# Patient Record
Sex: FEMALE | Race: WHITE | Employment: UNEMPLOYED | URBAN - METROPOLITAN AREA
[De-identification: names, ages, dates, MRNs, and addresses within clinical notes are randomized per-mention and may not be internally consistent; named-entity substitution may affect disease eponyms.]

---

## 2017-03-25 ENCOUNTER — HOSPITAL ENCOUNTER (EMERGENCY)
Facility: CLINIC | Age: 27
Discharge: HOME | End: 2017-03-25
Attending: PHYSICIAN ASSISTANT | Admitting: PHYSICIAN ASSISTANT
Payer: COMMERCIAL

## 2017-03-25 VITALS
TEMPERATURE: 98.5 F | RESPIRATION RATE: 20 BRPM | SYSTOLIC BLOOD PRESSURE: 122 MMHG | BODY MASS INDEX: 24.55 KG/M2 | WEIGHT: 130 LBS | OXYGEN SATURATION: 99 % | HEIGHT: 61 IN | HEART RATE: 88 BPM | DIASTOLIC BLOOD PRESSURE: 90 MMHG

## 2017-03-25 DIAGNOSIS — K13.79 MOUTH PAIN: ICD-10-CM

## 2017-03-25 PROCEDURE — 99283 EMERGENCY DEPT VISIT LOW MDM: CPT | Mod: 25

## 2017-03-25 PROCEDURE — 64400 NJX AA&/STRD TRIGEMINAL NRV: CPT

## 2017-03-25 RX ORDER — HYDROCODONE BITARTRATE AND ACETAMINOPHEN 5; 325 MG/1; MG/1
1-2 TABLET ORAL EVERY 4 HOURS PRN
Qty: 12 TABLET | Refills: 0 | Status: SHIPPED | OUTPATIENT
Start: 2017-03-25 | End: 2017-05-05

## 2017-03-25 RX ORDER — BUPIVACAINE HYDROCHLORIDE 5 MG/ML
INJECTION, SOLUTION PERINEURAL
Status: DISCONTINUED
Start: 2017-03-25 | End: 2017-03-25 | Stop reason: HOSPADM

## 2017-03-25 RX ORDER — PENICILLIN V POTASSIUM 500 MG/1
500 TABLET, FILM COATED ORAL 4 TIMES DAILY
Qty: 28 TABLET | Refills: 0 | Status: SHIPPED | OUTPATIENT
Start: 2017-03-25 | End: 2017-04-01

## 2017-03-25 NOTE — ED PROVIDER NOTES
"  History   Chief Complaint:  Dental Pain     HPI   Blanca Coleman is a 26 year old female who presents to the emergency department for evaluation of dental pain. The patient indicates that today she began having left lower dental pain that started today. She denies any pus or drainage from the tooth. She denies any fevers or other associated symptoms.     Allergies:  NKDA     Medications:    The patient is currently on no regular medications.      Past Medical History:    The patient denies any significant past medical history.    Past Surgical History:    The patient does not have any pertinent past surgical history  Family History:    No past pertinent family history.     Social History:  Negative for tobacco use.  Negative for alcohol use.    Review of Systems   HENT: Positive for dental problem.    All other systems reviewed and are negative.    Physical Exam   First Vitals:  BP: 122/90  Pulse: 88  Temp: 98.5  F (36.9  C)  Resp: 20  Height: 154.9 cm (5' 1\")  Weight: 59 kg (130 lb)  SpO2: 99 %    Physical Exam  General: Well-nourished, Mild  discomfort  Eyes: PERRL, conjunctivae pink no scleral icterus or conjunctival injection  ENT:  Moist mucus membranes.  Tooth # 17 with erythema. The tooth is cracked.  No drainage.  No abscess along gumline.  No cheek or submandibular edema.  No trismus.  Normal voice.  Respiratory:  Normal respiratory effort and breath sounds.  CV: Normal rate   Neuro: Alert and oriented to person/place/time  Skin: Warm and dry. Normal appearance of visualized exposed skin  Psychiatric: Affect normal. Normal personal interaction.     Emergency Department Course   Procedure:  PROCEDURE:  Dental Block  LOCATION:  Lower left  ANESTHESIA: Regional block using Bupivacaine 0.5% total of 3 mLs  PROCEDURE NOTE: The patient tolerated the procedure well with good relief of discomfort and there were no complications.    Emergency Department Course:  Nursing notes and vitals reviewed. I performed an " exam of the patient as documented above.     Findings and plan explained to the Patient. Patient discharged home with instructions regarding supportive care, medications, and reasons to return. The importance of close follow-up was reviewed. The patient was prescribed Norco and Penicillin.     Impression & Plan    Medical Decision Making:  Blanca Coleman is a 26 year old female who presents for evaluation of jaw pain. This appears to be secondary to a dental issue. There is no abscess detected around the tooth amenable to incision and drainage. The differential diagnosis includes: cracked tooth syndrome, pulpitis, sub-apical abscess, facial cellulitis, alveolitis amongst others. There is no evidence of deep space infections, significant facial swelling, Lemierre's Syndrome or Reinier's angina. There are no posterior pharyngeal space (RPA, PTA) infections detected.  Follow up with a dentist/endodontist in the coming days is indicated for further work up and treatment.  Will start pain medication and antibiotics.      Diagnosis:    ICD-10-CM    1. Mouth pain K13.79      Discharge Medications:  New Prescriptions    HYDROCODONE-ACETAMINOPHEN (NORCO) 5-325 MG PER TABLET    Take 1-2 tablets by mouth every 4 hours as needed for moderate to severe pain    PENICILLIN V POTASSIUM (VEETID) 500 MG TABLET    Take 1 tablet (500 mg) by mouth 4 times daily for 7 days     Janay Flynn  3/25/2017   [facility] EMERGENCY DEPARTMENT    Janay ORELLANA Said, am serving as a scribe on 3/25/2017 at 3:16 PM to personally document services performed by Arabella Condon based on my observations and the provider's statements to me.        Arabella Condon PA-C  03/25/17 [id number]

## 2017-03-25 NOTE — ED AVS SNAPSHOT
[facility] Emergency Department    [address]    [facility] [address]    Phone:  [phone]    Fax:  [phone]                                       Blanca Coleman   MRN: [id number]    Department:  [facility] Emergency Department   Date of Visit:  3/25/2017           Patient Information     Date Of Birth          1990        Your diagnoses for this visit were:     Mouth pain        You were seen by Arabella Condon PA-C.      Follow-up Information     Follow up with [facility] Emergency Department.    Specialty:  EMERGENCY MEDICINE    Why:  If symptoms worsen    Contact information:    [address]  [facility] [address] [phone]        Follow up with Clinic, [facility] In 2 days.    Specialty:  Internal Medicine    Why:  As needed    Contact information:    [address]  [facility] [address]  [phone]          Discharge Instructions       Take ibuprofen 600 mg every 6 hours for the next 3-5 days or until followup with dentist. If given other medications used as directed. If given antibiotics make sure and finish the entire course. Ultimately you need to follow up with your dentist. If you cannot get into your dentist in the next couple of days I have given you a list of other options for dental care. You will need to get further pain management from your dentist. Return to emergency room if you develop high fevers, difficulty breathing, shortness of breath, the inability to swallow your own saliva, or for other concerns.     Emergency Dental Care  [email or web address]   [address]   Directions   [phone]    Emer[facility] Dental Services  [email or web address]  Emergency Dental Clinic You Can Rely On. Open 24 Hours. Call Now.  [address], [place]   Directions   [phone]    [facility]  Address: [facility] [facility], [address], [place] [address]    Phone: [phone]    Dental Clinics Accepting [place] Clients    Psychiatric    Child and Teen Checkups  [facility]  [phone]    [facility]  [address]  [place], [place]  [phone]    [facility]  [address] [place], [place]  [phone]    [facility]  [address] [facility]  [phone]    [facility]  [address] [facility]  [phone]    [facility]  [address] [facility]  [phone]    [facility]  [address]  [phone]    [facility]  [address]  [phone]    [place] [facility]  [place]  [phone]    [facility]  [address] [facility]  [phone]    [facility]  [address] [facility]  [phone]    [facility]  [address] [place] [address]  [phone]    [facility]  [address] [facility]  [phone]    [facility]  [address] [facility]  [phone]    ADDITIONAL RESOURCES    [facility]  [phone]    [facility]  [phone]    [facility]  [phone]    This web site contains many locations and information about what services they provide:    http://[place]-low-cost-eyhoey-allv-ibvzbhoph5.friendshelpingfriends.Memento.Cisiv/        24 Hour Appointment Hotline       To make an appointment at any [facility], call [phone] ([phone]). If you don't have a family doctor or clinic, we will help you find one. [facility] clinics are conveniently located to serve the needs of you and your family.             Review of your medicines      START taking        Dose / Directions Last dose taken    HYDROcodone-acetaminophen 5-325 MG per tablet   Commonly known as:  NORCO   Dose:  1-2 tablet   Quantity:  12 tablet        Take 1-2 tablets by mouth every 4 hours as needed  for moderate to severe pain   Refills:  0        penicillin V potassium 500 MG tablet   Commonly known as:  VEETID   Dose:  500 mg   Quantity:  28 tablet        Take 1 tablet (500 mg) by mouth 4 times daily for 7 days   Refills:  0                Prescriptions were sent or printed at these locations (2 Prescriptions)                   Other Prescriptions                Printed at Department/Unit printer (2 of 2)         HYDROcodone-acetaminophen (NORCO) 5-325 MG per tablet               penicillin V potassium (VEETID) 500 MG tablet                Orders Needing Specimen Collection     None      Pending Results     No orders found from 3/23/2017 to 3/26/2017.            Pending Culture Results     No orders found from 3/23/2017 to 3/26/2017.             Test Results from your hospital stay            Clinical Quality Measure: Blood Pressure Screening     Your blood pressure was checked while you were in the emergency department today. The last reading we obtained was  BP: 122/90 . Please read the guidelines below about what these numbers mean and what you should do about them.  If your systolic blood pressure (the top number) is less than 120 and your diastolic blood pressure (the bottom number) is less than 80, then your blood pressure is normal. There is nothing more that you need to do about it.  If your systolic blood pressure (the top number) is 120-139 or your diastolic blood pressure (the bottom number) is 80-89, your blood pressure may be higher than it should be. You should have your blood pressure rechecked within a year by a primary care provider.  If your systolic blood pressure (the top number) is 140 or greater or your diastolic blood pressure (the bottom number) is 90 or greater, you may have high blood pressure. High blood pressure is treatable, but if left untreated over time it can put you at risk for heart attack, stroke, or kidney failure. You should have your blood pressure rechecked by a primary  "care provider within the next 4 weeks.  If your provider in the emergency department today gave you specific instructions to follow-up with your doctor or provider even sooner than that, you should follow that instruction and not wait for up to 4 weeks for your follow-up visit.        Thank you for choosing [facility]       Thank you for choosing [facility] for your care. Our goal is always to provide you with excellent care. Hearing back from our patients is one way we can continue to improve our services. Please take a few minutes to complete the written survey that you may receive in the mail after you visit with us. Thank you!        Waynaharwhat3words Information     [employer] lets you send messages to your doctor, view your test results, renew your prescriptions, schedule appointments and more. To sign up, go to [email or web address] . Click on \"Log in\" on the left side of the screen, which will take you to the Welcome page. Then click on \"Sign up Now\" on the right side of the page.     You will be asked to enter the access code listed below, as well as some personal information. Please follow the directions to create your username and password.     Your access code is: [email or web address]  Expires: 2017  3:46 PM     Your access code will  in 90 days. If you need help or a new code, please call your [facility] clinic or [phone].        Care EveryWhere ID     This is your Care EveryWhere ID. This could be used by other organizations to access your [facility] medical records  [id number]        After Visit Summary       This is your record. Keep this with you and show to your community pharmacist(s) and doctor(s) at your next visit.                  "

## 2017-03-25 NOTE — DISCHARGE INSTRUCTIONS
Take ibuprofen 600 mg every 6 hours for the next 3-5 days or until followup with dentist. If given other medications used as directed. If given antibiotics make sure and finish the entire course. Ultimately you need to follow up with your dentist. If you cannot get into your dentist in the next couple of days I have given you a list of other options for dental care. You will need to get further pain management from your dentist. Return to emergency room if you develop high fevers, difficulty breathing, shortness of breath, the inability to swallow your own saliva, or for other concerns.     Emergency Dental Care  [email or web address]   [address]   Directions   [phone]    Emergency Dental Services  [email or web address]  Emergency Dental Clinic You Can Rely On. Open 24 Hours. Call Now.  [address] [facility] [address]   Directions   [phone]    [facility]  Address: [address] [place] [address], [address], [place] [address]   Phone: [phone]    Dental Clinics Accepting [facility]    Child and Teen Checkups  [facility]  [phone]    [facility]  [address]  [place], [place]  [phone]    The [address]  [phone]    [facility]  [address] [facility]  [phone]    [facility]  [address] [facility]  [phone]    [facility]  [address] [facility]  [phone]    [facility]  [address] [facility]  [phone]    [facility]  [address]  [phone]    [place] [facility]  [place]  [phone]    [facility]  [address] [place] [address]  [phone]    [facility]  [address] [facility]  [phone]    [facility]  [address] [place] [address]  [phone]    [facility]  [address] [facility]  [place]  [phone]    [facility]  [address] [facility]  [phone]    ADDITIONAL RESOURCES    [facility]  [phone]    [facility]  [phone]    [facility]  [phone]    This web site contains many locations and information about what services they provide:    http://[email or web address]-[email or web address]-lajy-[email or web address].friendshelpingfriends.[email or web address].Causata/

## 2017-03-25 NOTE — ED AVS SNAPSHOT
[facility] Emergency Department    [address]    [facility] [address]    Phone:  [phone]    Fax:  [phone]                                       Blanca Coleman   MRN: [id number]    Department:  [facility] Emergency Department   Date of Visit:  3/25/2017           After Visit Summary Signature Page     I have received my discharge instructions, and my questions have been answered. I have discussed any challenges I see with this plan with the nurse or doctor.    ..........................................................................................................................................  Patient/Patient Representative Signature      ..........................................................................................................................................  Patient Representative Print Name and Relationship to Patient    ..................................................               ................................................  Date                                            Time    ..........................................................................................................................................  Reviewed by Signature/Title    ...................................................              ..............................................  Date                                                            Time

## 2017-05-05 ENCOUNTER — HOSPITAL ENCOUNTER (EMERGENCY)
Facility: CLINIC | Age: 27
Discharge: HOME | End: 2017-05-05
Attending: EMERGENCY MEDICINE | Admitting: EMERGENCY MEDICINE
Payer: COMMERCIAL

## 2017-05-05 VITALS
TEMPERATURE: 98.2 F | DIASTOLIC BLOOD PRESSURE: 79 MMHG | HEART RATE: 98 BPM | OXYGEN SATURATION: 100 % | SYSTOLIC BLOOD PRESSURE: 109 MMHG | RESPIRATION RATE: 16 BRPM

## 2017-05-05 DIAGNOSIS — K08.89 PAIN, DENTAL: ICD-10-CM

## 2017-05-05 DIAGNOSIS — K04.7 DENTAL INFECTION: ICD-10-CM

## 2017-05-05 PROCEDURE — 25000132 ZZH RX MED GY IP 250 OP 250 PS 637: Performed by: EMERGENCY MEDICINE

## 2017-05-05 PROCEDURE — 99283 EMERGENCY DEPT VISIT LOW MDM: CPT | Mod: 25

## 2017-05-05 PROCEDURE — 64400 NJX AA&/STRD TRIGEMINAL NRV: CPT

## 2017-05-05 RX ORDER — PENICILLIN V POTASSIUM 500 MG/1
500 TABLET, FILM COATED ORAL 4 TIMES DAILY
Qty: 40 TABLET | Refills: 0 | Status: SHIPPED | OUTPATIENT
Start: 2017-05-05 | End: 2017-05-15

## 2017-05-05 RX ORDER — BUPIVACAINE HYDROCHLORIDE AND EPINEPHRINE 5; 5 MG/ML; UG/ML
INJECTION, SOLUTION PERINEURAL
Status: DISCONTINUED
Start: 2017-05-05 | End: 2017-05-05 | Stop reason: HOSPADM

## 2017-05-05 RX ORDER — AMOXICILLIN 500 MG/1
1000 CAPSULE ORAL ONCE
Status: COMPLETED | OUTPATIENT
Start: 2017-05-05 | End: 2017-05-05

## 2017-05-05 RX ORDER — HYDROCODONE BITARTRATE AND ACETAMINOPHEN 5; 325 MG/1; MG/1
1-2 TABLET ORAL EVERY 4 HOURS PRN
Qty: 15 TABLET | Refills: 0 | Status: SHIPPED | OUTPATIENT
Start: 2017-05-05 | End: 2018-01-19

## 2017-05-05 RX ADMIN — AMOXICILLIN 1000 MG: 500 CAPSULE ORAL at 03:44

## 2017-05-05 NOTE — ED AVS SNAPSHOT
[facility] Emergency Department    [address]    [facility] [address]    Phone:  [phone]    Fax:  [phone]                                       Blanca Coleman   MRN: [id number]    Department:  [facility] Emergency Department   Date of Visit:  5/5/2017           After Visit Summary Signature Page     I have received my discharge instructions, and my questions have been answered. I have discussed any challenges I see with this plan with the nurse or doctor.    ..........................................................................................................................................  Patient/Patient Representative Signature      ..........................................................................................................................................  Patient Representative Print Name and Relationship to Patient    ..................................................               ................................................  Date                                            Time    ..........................................................................................................................................  Reviewed by Signature/Title    ...................................................              ..............................................  Date                                                            Time

## 2017-05-05 NOTE — ED AVS SNAPSHOT
[facility] Emergency Department    [address]    [facility] [address]    Phone:  [phone]    Fax:  [phone]                                       Blanca Coleman   MRN: [id number]    Department:  [facility] Emergency Department   Date of Visit:  5/5/2017           Patient Information     Date Of Birth          1990        Your diagnoses for this visit were:     Dental infection     Pain, dental        You were seen by Carl Navarrete MD.      Follow-up Information     Follow up with [facility] In 4 days.    Specialty:  Internal Medicine    Why:  and with your dentist as soon as possible    Contact information:    [address]  [facility] [address]  [phone]          Discharge Instructions         Discharge Instructions  Dental Pain    You have been seen today for a toothache. Your pain may be caused by an exposed nerve, an infection (pulpitis), a root abscess, or other problems. You will need to see a dentist for a solution to your tooth problem. Emergency Department care is only to help control your problem until you can see a dentist.  Today, we did not find any sign that your toothache was caused by a serious condition, but sometimes symptoms develop over time and cannot be found during an emergency visit, so it is very important that you follow up with your dentist.      Return to the Emergency Department if:    You develop a fever over 101 degrees Fahrenheit.    You can t open your mouth normally, can t move your tongue well, or can t swallow.    You have new or increased swelling of your face or neck.    You develop drainage of pus or foul smelling material from around your tooth.  What can I do to help myself?    Take any antibiotic the doctor may have prescribed for you today.    Avoid very hot or very cold foods as both can cause pain.    Make an appointment to see a dentist as soon as possible. If you wish, we can  provide you with a list of low-cost dental clinics.   If you were given a prescription for medicine here today, be sure to read all of the information (including the package insert) that comes with your prescription.  This will include important information about the medicine, its side effects, and any warnings that you need to know about.  The pharmacist who fills the prescription can provide more information and answer questions you may have about the medicine.  If you have questions or concerns that the pharmacist cannot address, please call or return to the Emergency Department.   Opioid Medication Information    Pain medications are among the most commonly prescribed medicines, so we are including this information for all our patients. If you did not receive pain medication or get a prescription for pain medicine, you can ignore it.     You may have been given a prescription for an opioid (narcotic) pain medicine and/or have received a pain medicine while here in the Emergency Department. These medicines can make you drowsy or impaired. You must not drive, operate dangerous equipment, or engage in any other dangerous activities while taking these medications. If you drive while taking these medications, you could be arrested for DUI, or driving under the influence. Do not drink any alcohol while you are taking these medications.     Opioid pain medications can cause addiction. If you have a history of chemical dependency of any type, you are at a higher risk of becoming addicted to pain medications.  Only take these prescribed medications to treat your pain when all other options have been tried. Take it for as short a time and as few doses as possible. Store your pain pills in a secure place, as they are frequently stolen and provide a dangerous opportunity for children or visitors in your house to start abusing these powerful medications. We will not replace any lost or stolen medicine.  As soon as your pain  is better, you should flush all your remaining medication.     Many prescription pain medications contain Tylenol  (acetaminophen), including Vicodin , Tylenol #3 , Norco , Lortab , and Percocet .  You should not take any extra pills of Tylenol  if you are using these prescription medications or you can get very sick.  Do not ever take more than 3000 mg of acetaminophen in any 24 hour period.    All opioids tend to cause constipation. Drink plenty of water and eat foods that have a lot of fiber, such as fruits, vegetables, prune juice, apple juice and high fiber cereal.  Take a laxative if you don t move your bowels at least every other day. Miralax , Milk of Magnesia, Colace , or Senna  can be used to keep you regular.      Remember that you can always come back to the Emergency Department if you are not able to see your regular doctor in the amount of time listed above, if you get any new symptoms, or if there is anything that worries you.        Discharge Instructions  Dental Pain    You have been seen today for a toothache. Your pain may be caused by an exposed nerve, an infection (pulpitis), a root abscess, or other problems. You will need to see a dentist for a solution to your tooth problem. Emergency Department care is only to help control your problem until you can see a dentist.  Today, we did not find any sign that your toothache was caused by a serious condition, but sometimes symptoms develop over time and cannot be found during an emergency visit, so it is very important that you follow up with your dentist.      Return to the Emergency Department if:    You develop a fever over 101 degrees Fahrenheit    You can t open your mouth normally, can t move your tongue well, or can t swallow    You have new or increased swelling of your face or neck.    You develop drainage of pus or foul smelling material from around your tooth.  What can I do to help myself?    Take any antibiotic the doctor may have  prescribed for you today.    Avoid very hot or very cold foods as both can cause pain.    Make an appointment to see a dentist as soon as possible. If you wish, we can provide you with a list of low-cost dental clinics.       Remember that you can always come back to the Emergency Department if you are not able to see your regular doctor in the amount of time listed above, if you get any new symptoms, or if there is anything that worries you.        Dental Resources  Name/Address/Phone Eligibility Hours Fee   [facility]  [address], [address]  [place] [address]  [phone] Anyone Call for appointment [place]  Medical Assistance  Private Insurance   [facility]  [facility]  [address] [place] [address]  [phone] Anyone Call for appointment    [facility] refers to low-cost dental clinics for non-preventive care    Icelandic Interpreters available Prices start at   Adults        Cleaning $36-$160        X-Ray $20-40  Children        Cleaning $15        X-ray $10-20        Fluoride $10  Accepts cash, check or credit;  Does not take insurance or MA.   [facility]  [address] [place]  [address]  [phone] Anyone Afternoons and evenings    September-May    Answers phones after 10 AM $30.00 per visit   ($15.00 per visit if 62 or older)   Preventive care.  Restoration care; sliding fee; MA   [facility]  [address] [place] [address]  [phone] Children birth to age 18 and pregnant women    [facility] Residents without insurance will be asked to apply for Assured Care. M TH F 8:30 am - 5 pm  T W 8:30 am - 7 pm    30 locations metro wide    Call for appointment and to confirm hours. Sliding Fee  [facility]  Assured Access  Private Insurance    8 Languages Spoken   [facility]  [address] [place] [phone] Anyone Call for  appointment Sliding Fee  Accept insurance, [place],   [place] and self-pay.  Call if no insurance.    All services provided.  Staff fluent in Hmong, Laotian, Andre, Ukrainian, Greek, Dominican, and Farsi.   [facility]  [address] [place] [address]  [phone] Children  Adults in a walk in basis Mon - Fri. 8 - 5 pm       (closed 1-2 pm)  General Dentists & Hygienists  Private Dentists  Dentures Fees based on family size and income ranging from 40% - 100% payment by patient.   [facility]  [address] [place]  [address]    [phone] Anyone Mon - Fri 7:30 am - 5:00 pm  By Appt.    Tues & Wed @ 3:00 call for urgent care Appt for next day service Sliding fee:  MA; Insurance   [facility]  [address] [place]  [address]  [phone] Anyone Call for an appointment.  Days open vary every semester. Adult cleaning $25  Child cleaning $15  X-Rays $10-$15  Whitening services available  $75, includes cleaning  Seniors 50% off   [facility]  [address] [place]  [address]  [phone] Anyone M-F 8 am - 5 pm Most insurances accepted.  MA and Sliding Scale.   [facility]  [address] [place] [address]  [phone] Anyone without insurance Call to make appt   M-F 9:00 am - 5 pm   (Closed noon hour 12-1)    6 pm- 8 pm Evening hours also available for care Sliding fee based on income and size of household.   [facility]  D[facility] Clinic  [address] [place]  [address]  [phone] press option 1    For the [facility] press option 4 Anyone              Anyone Monday  4 - 6:30 pm  Tuesday 12:30 - 3 & 4-6:30  Thursday 8:30 - 11 am & 12-2:30 pm  September through May only    All year around on Thursdays from 5-9 pm (only time a dentist is in.) Cleanings & X-Rays Only  Cleanings:  Adults $30                    Kids $20  X-Rays:  Adults $34                Kids $10    MA and Sliding fee   [facility]  [address] [place] [address]    [phone] Anyone    (Hmong interpreters available) M-F 8:00 am - 5:00 pm       By appointment only  (same day appointments available) Sliding fee ($40+ may be due at appointment, remainder billed); MA; Insurance   [facility]  [address] [place] [address]    [phone]   Anyone    (Hmong interpreters available) M-Th 8:00 am - 8:00 pm  F 8:00 am - 5:00 pm    By appointment only  (same day appointments available) Sliding fee ($40+ may be due at appointment, remainder billed); MA; Insurance   [facility]  [address] [place], [place]  [address] [phone] Anyone    Must live within [facility] to qualify for sliding fee services Mon, Tues, Thurs, Fri  8:30 am - 5:00 pm  Wed. 8:30 am - 7:00 pm  All other services by appt. only Sliding Fee; MA   Offer payment plans    Have financial workers that will assist with [place] and will use sliding fee for those who do not qualify.      [facility]  [address] [place] [phone] Anyone without insurance     Hours and day of week vary  (Call ahead for hours)    Walk-in only Free Services    Cleanings; Fillings; Extractions   The [address]  [phone] Anyone Call for an appointment Accept patients with [place] and Medical Assistance.  10% discount if bill is paid in full on day of service.  No sliding fee scale.     [facility]  [address] [place], [place] [phone] Anyone M-F  8 am-5 pm  Call for appt.    Walk-in hours 8 am - 11am and 1 pm - 5 pm, however take scheduled appointments first    No emergency services or oral surgeries Sliding Fee available with an [place] or [place] denial letter and proof of  income.    Accepts Assured Access card and MA coverage.     Name/Address/Phone Eligibility Hours Fee   [facility]  [address] [place], [place]  [phone] Anyone with emergencies only M & W clinic begins at 6 pm   Call ahead    Alternate Fridays for children by Appt only Free   [facility]  [address] [place] [address]  [phone]    Emergencies (adults only):  [phone] Anyone Free walk-in screens for oral surgery    Call ahead for hours    All other services by appt. only  Accepts MA for pediatrics only    Rates are about 25% - 40% less than private dental office.    No sliding fee scale   [facility]  [address] [place] [address]  [phone] Anyone as long as they do not have health insurance Hours on Mondays, Tuesdays, and Thursdays Sliding fees based on monthly income    No root canals, tooth pulls or emergencies   [facility]  [address] [place] [phone] Anyone  M - F 8:00 am - 5:00 pm  Wed 8:00 am - 8 pm Sliding fees; MA; Insurance   [facility] [address]  [phone] Anyone age 55+ M - F 8:00 am - 4:30 pm    Appt. only Set slightly lower fees;  MA; Insurance         Give Kids a smile day in [facility] Children Takes place in February at a few locations                          Dental Pain:      Dear Emergency Department Patient:     Here at [facility], we are always pleased to evaluate you for emergency conditions and offer a screening examination. Today, we have seen you and determined that you have dental pain and/or a dental problem.  We do not have the equipment and/or advanced training to perform definitive dental care, therefore, you need to be seen by a dentist for further care.     You may see your regular dentist if you have one, or we have attached a list of community dental providers,  including some who provide care at a reduced fee.      Please be aware that if a narcotic medication was prescribed, it will not be refilled in the emergency department.  Accordingly, if you should have ongoing problems and/or pain, you should contact a dentist right away for definitive treatment.    Sincerely,        [facility] (HARRY)              24 Hour Appointment Hotline       To make an appointment at any [facility], call [phone] ([phone]). If you don't have a family doctor or clinic, we will help you find one. [facility] are conveniently located to serve the needs of you and your family.             Review of your medicines      START taking        Dose / Directions Last dose taken    HYDROcodone-acetaminophen 5-325 MG per tablet   Commonly known as:  NORCO   Dose:  1-2 tablet   Quantity:  15 tablet        Take 1-2 tablets by mouth every 4 hours as needed for moderate to severe pain   Refills:  0        penicillin V potassium 500 MG tablet   Commonly known as:  VEETID   Dose:  500 mg   Quantity:  40 tablet        Take 1 tablet (500 mg) by mouth 4 times daily for 10 days   Refills:  0                Prescriptions were sent or printed at these locations (2 Prescriptions)                   Other Prescriptions                Printed at Department/Unit printer (2 of 2)         HYDROcodone-acetaminophen (NORCO) 5-325 MG per tablet               penicillin V potassium (VEETID) 500 MG tablet                Orders Needing Specimen Collection     None      Pending Results     No orders found from 5/3/2017 to 5/6/2017.            Pending Culture Results     No orders found from 5/3/2017 to 5/6/2017.            Pending Results Instructions     If you had any lab results that were not finalized at the time of your Discharge, you can call the ED Lab Result RN at [phone]. You will be contacted by this team for any positive Lab results or changes in  treatment. The nurses are available 7 days a week from 10A to 6:30P.  You can leave a message 24 hours per day and they will return your call.        Test Results From Your Hospital Stay               Clinical Quality Measure: Blood Pressure Screening     Your blood pressure was checked while you were in the emergency department today. The last reading we obtained was  BP: 109/79 . Please read the guidelines below about what these numbers mean and what you should do about them.  If your systolic blood pressure (the top number) is less than 120 and your diastolic blood pressure (the bottom number) is less than 80, then your blood pressure is normal. There is nothing more that you need to do about it.  If your systolic blood pressure (the top number) is 120-139 or your diastolic blood pressure (the bottom number) is 80-89, your blood pressure may be higher than it should be. You should have your blood pressure rechecked within a year by a primary care provider.  If your systolic blood pressure (the top number) is 140 or greater or your diastolic blood pressure (the bottom number) is 90 or greater, you may have high blood pressure. High blood pressure is treatable, but if left untreated over time it can put you at risk for heart attack, stroke, or kidney failure. You should have your blood pressure rechecked by a primary care provider within the next 4 weeks.  If your provider in the emergency department today gave you specific instructions to follow-up with your doctor or provider even sooner than that, you should follow that instruction and not wait for up to 4 weeks for your follow-up visit.        Thank you for choosing [facility]       Thank you for choosing [facility] for your care. Our goal is always to provide you with excellent care. Hearing back from our patients is one way we can continue to improve our services. Please take a few minutes to complete the written survey that you may receive in the mail after you  "visit with us. Thank you!        [email or web address] Information     [employer] lets you send messages to your doctor, view your test results, renew your prescriptions, schedule appointments and more. To sign up, go to [email or web address] . Click on \"Log in\" on the left side of the screen, which will take you to the Welcome page. Then click on \"Sign up Now\" on the right side of the page.     You will be asked to enter the access code listed below, as well as some personal information. Please follow the directions to create your username and password.     Your access code is: [email or web address]  Expires: 2017  3:46 PM     Your access code will  in 90 days. If you need help or a new code, please call your [facility] clinic or [phone].        Care EveryWhere ID     This is your Care EveryWhere ID. This could be used by other organizations to access your [facility] medical records  [id number]        After Visit Summary       This is your record. Keep this with you and show to your community pharmacist(s) and doctor(s) at your next visit.                  "

## 2017-05-05 NOTE — ED PROVIDER NOTES
CHIEF COMPLAINT:  Dental pain.      HISTORY OF PRESENT ILLNESS:  Ms. Blanca Coleman is a pleasant 26-year-old female who came to the ER today for pain involving her left mandibular wisdom tooth.  The pain actually first occurred a couple months ago but resolved after a course of antibiotics.  She never followed up with her dentist due to life complications and the fact that it was better.  The pain began to bother her again a couple of days ago and was worse tonight.  She was unable to sleep despite taking over-the-counter pain medicines so came here to the ER.  No known trauma to the tooth.  No specific trigger for the pain to occur lately.  No fever or chills.  No trouble swallowing.  No trouble opening or closing her mouth.  No facial swelling.      PAST MEDICAL HISTORY:  None.      PAST SURGICAL HISTORY:  None.      MEDICATIONS:  Currently none; she had been on a course of penicillin about a month ago.      ALLERGIES:  None.      SOCIAL HISTORY:  She is a nonsmoker, no alcohol.      REVIEW OF SYSTEMS:  Negative except as above.      PHYSICAL EXAMINATION:   VITAL SIGNS:  Blood pressure 109/79, pulse 98, respiratory rate 16, O2 sat 100% on room air, temperature 98.2.   GENERAL:  This is a smiling, nontoxic female.  She does appear uncomfortable.   HEENT:  Head is atraumatic, normocephalic.  No facial swelling.  No buccal space abscess.  No trismus, no submandibular swelling.  Oral mucosa is pink and moist.  She is status post extraction of 3 wisdom teeth but the wisdom tooth on the left mandible appears to be present and there is a large dental caries affecting the posterior and lateral portion of that tooth.  There is no surrounding gingival erythema, no visible abscess or palpable abscess intraorally.  No buccal space of submandibular abscess or swelling.  Pharynx is normal without any erythema.  Uvula midline phonation normal.  Airway patent.   NECK:  Normal range of motion.  Trachea midline, no stridor, no  cervical adenopathy.   CARDIOVASCULAR:  Regular rate and rhythm, no murmurs, rubs or gallops  pelvic.   PULMONARY:  Lungs sounds clear and equal.   SKIN:  Warm and dry, no rash, pallor, cyanosis or diaphoresis.   NEUROLOGIC:  Alert and oriented x3.  GCS is 15.  Cognition and speech normal strength and sensation.  Cranial nerves are grossly intact.   PSYCHIATRIC:  Normal.      EMERGENCY DEPARTMENT COURSE:  The patient endorsed very good pain relief after a previous dental block performed last time she was here in the emergency room and requested that we do that again.   Procedure would be left inferior alveolar nerve dental block.      PROCEDURE:  The patient was positioned appropriately.  Topical anesthesia with benzocaine was applied;  using a 27-gauge needle, I infiltrated about 3 mL of 0.5% bupivacaine with epinephrine into the soft tissue adjacent to the patient's left mandibular ramus in the location appropriate for an inferior alveolar nerve block.  Aspiration confirmed no intravascular injection.  Good anesthesia was achieved after the  block.  No complications were noted.      MEDICAL DECISION MAKING AND PLAN:  This is a 26-year-old female who came back to the ER [facility] for evaluation of pain stemming from her left mandibular wisdom tooth and affecting her left jaw and face.  She does have a dental cavity there and I suspect she is developing pulpitis as a cause for her pain.  No abscess that requires drainage.  We will put her back on antibiotics.  No evidence for Reinier's angina or other severe complications or airway compromise or lesion.        We gave her a dental referral sheet for outpatient dental followup.  She understands that we cannot pull the tooth here in the ER.  Additionally, since this is her second visit for this tooth I did inform her of the [facility] dental pain policy and she understands we cannot give future prescriptions for opiates.  She says that she never actually filled her first  prescription and according to the [place] prescription database, I do not see any filled prescriptions.  We also will put the patient back on antibiotics.  She says she will follow up with a dentist as soon as possible.  Return to the ER if progressive facial swelling and trouble swallowing or any other concerns.      CLINICAL IMPRESSION:   1.  Dental pain.   2.  Jaw pain.         ANABEL WEN MD             D: 2017 08:10   T: 2017 11:52   MT: [id number]      Name:     CHRIS DELANEY   MRN:      [id number]        Account:      [id number]   :      1990           Visit Date:   2017      Document: [id number]

## 2017-05-05 NOTE — ED NOTES
Pt c/o dental pain, same thing she was here for last month on the 25th. Has not seen dentist.    Pt A&O x 3, CMS x 3, ABCD's adequate in triage

## 2017-05-05 NOTE — DISCHARGE INSTRUCTIONS
Discharge Instructions  Dental Pain    You have been seen today for a toothache. Your pain may be caused by an exposed nerve, an infection (pulpitis), a root abscess, or other problems. You will need to see a dentist for a solution to your tooth problem. Emergency Department care is only to help control your problem until you can see a dentist.  Today, we did not find any sign that your toothache was caused by a serious condition, but sometimes symptoms develop over time and cannot be found during an emergency visit, so it is very important that you follow up with your dentist.      Return to the Emergency Department if:    You develop a fever over 101 degrees Fahrenheit.    You can t open your mouth normally, can t move your tongue well, or can t swallow.    You have new or increased swelling of your face or neck.    You develop drainage of pus or foul smelling material from around your tooth.  What can I do to help myself?    Take any antibiotic the doctor may have prescribed for you today.    Avoid very hot or very cold foods as both can cause pain.    Make an appointment to see a dentist as soon as possible. If you wish, we can provide you with a list of low-cost dental clinics.   If you were given a prescription for medicine here today, be sure to read all of the information (including the package insert) that comes with your prescription.  This will include important information about the medicine, its side effects, and any warnings that you need to know about.  The pharmacist who fills the prescription can provide more information and answer questions you may have about the medicine.  If you have questions or concerns that the pharmacist cannot address, please call or return to the Emergency Department.   Opioid Medication Information    Pain medications are among the most commonly prescribed medicines, so we are including this information for all our patients. If you did not receive pain medication or get  a prescription for pain medicine, you can ignore it.     You may have been given a prescription for an opioid (narcotic) pain medicine and/or have received a pain medicine while here in the Emergency Department. These medicines can make you drowsy or impaired. You must not drive, operate dangerous equipment, or engage in any other dangerous activities while taking these medications. If you drive while taking these medications, you could be arrested for DUI, or driving under the influence. Do not drink any alcohol while you are taking these medications.     Opioid pain medications can cause addiction. If you have a history of chemical dependency of any type, you are at a higher risk of becoming addicted to pain medications.  Only take these prescribed medications to treat your pain when all other options have been tried. Take it for as short a time and as few doses as possible. Store your pain pills in a secure place, as they are frequently stolen and provide a dangerous opportunity for children or visitors in your house to start abusing these powerful medications. We will not replace any lost or stolen medicine.  As soon as your pain is better, you should flush all your remaining medication.     Many prescription pain medications contain Tylenol  (acetaminophen), including Vicodin , Tylenol #3 , Norco , Lortab , and Percocet .  You should not take any extra pills of Tylenol  if you are using these prescription medications or you can get very sick.  Do not ever take more than 3000 mg of acetaminophen in any 24 hour period.    All opioids tend to cause constipation. Drink plenty of water and eat foods that have a lot of fiber, such as fruits, vegetables, prune juice, apple juice and high fiber cereal.  Take a laxative if you don t move your bowels at least every other day. Miralax , Milk of Magnesia, Colace , or Senna  can be used to keep you regular.      Remember that you can always come back to the Emergency  Department if you are not able to see your regular doctor in the amount of time listed above, if you get any new symptoms, or if there is anything that worries you.        Discharge Instructions  Dental Pain    You have been seen today for a toothache. Your pain may be caused by an exposed nerve, an infection (pulpitis), a root abscess, or other problems. You will need to see a dentist for a solution to your tooth problem. Emergency Department care is only to help control your problem until you can see a dentist.  Today, we did not find any sign that your toothache was caused by a serious condition, but sometimes symptoms develop over time and cannot be found during an emergency visit, so it is very important that you follow up with your dentist.      Return to the Emergency Department if:    You develop a fever over 101 degrees Fahrenheit    You can t open your mouth normally, can t move your tongue well, or can t swallow    You have new or increased swelling of your face or neck.    You develop drainage of pus or foul smelling material from around your tooth.  What can I do to help myself?    Take any antibiotic the doctor may have prescribed for you today.    Avoid very hot or very cold foods as both can cause pain.    Make an appointment to see a dentist as soon as possible. If you wish, we can provide you with a list of low-cost dental clinics.       Remember that you can always come back to the Emergency Department if you are not able to see your regular doctor in the amount of time listed above, if you get any new symptoms, or if there is anything that worries you.        Dental Resources  Name/Address/Phone Eligibility Hours Fee   [facility]  [address], [address]  [place], [place] [id number] [phone] Anyone Call for appointment [place]  Medical Assistance  Private Insurance   [facility]  [address] [place] [address] [phone] Anyone Call  for appointment    [facility] refers to low-cost dental clinics for non-preventive care    [place] Interpreters available Prices start at   Adults        Cleaning $36-$160        X-Ray $20-40  Children        Cleaning $15        X-ray $10-20        Fluoride $10  Accepts cash, check or credit;  Does not take insurance or MA.   [facility]  [address] [place]  [address]  [phone] Anyone Afternoons and evenings    September-May    Answers phones after 10 AM $30.00 per visit   ($15.00 per visit if 62 or older)   Preventive care.  Restoration care; sliding fee; MA   [facility]  [address] [place] [address]  [phone] Children birth to age 18 and pregnant women    [facility] Residents without insurance will be asked to apply for Assured Care. M TH F 8:30 am - 5 pm  T W 8:30 am - 7 pm    30 locations metro wide    Call for appointment and to confirm hours. Sliding Fee  [facility]  Medical Assistance  Assured Access  Private Insurance    8 Languages Spoken   [facility] [address] [address]  [phone] Anyone Call for appointment Sliding Fee  Accept insurance, [place],   [place] and self-pay.  Call if no insurance.    All services provided.  Staff fluent in Hmong, Laotian, French, Yi, Hungarian, Uruguayan, and Farsi.   [facility]  [address] [place] [address]  [phone] Children  Adults in a walk in basis Mon - Fri. 8 - 5 pm       (closed 1-2 pm)  General Dentists & Hygienists  Private Dentists  Dentures Fees based on family size and income ranging from 40% - 100% payment by patient.   [facility]  [address] [place], [place]  [phone] Anyone Mon - Fri 7:30 am - 5:00 pm  By Appt.    Tues & Wed @ 3:00 call for urgent care Appt for next day service Sliding fee:  MA; Insurance   [facility]  [address]  [place]  [address]  [phone] Anyone Call for an appointment.  Days open vary every semester. Adult cleaning $25  Child cleaning $15  X-Rays $10-$15  Whitening services available  $75, includes cleaning  Seniors 50% off   [facility]  [address] [place]  [address]  [phone] Anyone M-F 8 am - 5 pm Most insurances accepted.  MA and Sliding Scale.   [facility]  [address] [place] [address]  [phone] Anyone without insurance Call to make appt   M-F 9:00 am - 5 pm   (Closed noon hour 12-1)    6 pm- 8 pm Evening hours also available for care Sliding fee based on income and size of household.   [facility]  Dental Clinic  [address] [place]  [address]  [phone] press option 1    For the [facility] press option 4 Anyone              Anyone Monday  4 - 6:30 pm  Tuesday 12:30 - 3 & 4-6:30  Thursday 8:30 - 11 am & 12-2:30 pm  September through May only    All year around on Thursdays from 5-9 pm (only time a dentist is in.) Cleanings & X-Rays Only  Cleanings:  Adults $30                   Kids $20  X-Rays:  Adults $34                Kids $10    MA and Sliding fee   [facility]  [address] [place] [address]    [phone] Anyone    (Hmong interpreters available) M-F 8:00 am - 5:00 pm       By appointment only  (same day appointments available) Sliding fee ($40+ may be due at appointment, remainder billed); MA; Insurance   [facility]  [address] [place], [place] [phone]   Anyone    (Hmong interpreters available) M-Th 8:00 am - 8:00 pm  F 8:00 am - 5:00 pm    By appointment only  (same day appointments available) Sliding fee ($40+ may be due at appointment, remainder billed); MA; Insurance   [facility]  [address] [place], [place]  [id number] [phone]  Anyone    Must live within [facility] to qualify for sliding fee services Mon, Tues, Thurs, Fri  8:30 am - 5:00 pm  Wed. 8:30 am - 7:00 pm  All other services by appt. only Sliding Fee; MA   Offer payment plans    Have financial workers that will assist with [place] and will use sliding fee for those who do not qualify.      [facility]  [address] [place] [address]  [phone] Anyone without insurance     Hours and day of week vary  (Call ahead for hours)    Walk-in only Free Services    Cleanings; Fillings; Extractions   [address]  [address]  [phone] Anyone Call for an appointment Accept patients with [place] and Medical Assistance.  10% discount if bill is paid in full on day of service.  No sliding fee scale.     [facility]  [address] [place] [address]  [phone] Anyone M-F  8 am-5 pm  Call for appt.    Walk-in hours 8 am - 11am and 1 pm - 5 pm, however take scheduled appointments first    No emergency services or oral surgeries Sliding Fee available with an [place] or [place] denial letter and proof of income.    Accepts Assured Access card and [place] coverage.     Name/Address/Phone Eligibility Hours Fee   [facility]  [address] [place]  [address]  [phone] Anyone with emergencies only M & W clinic begins at 6 pm   Call ahead    Alternate Fridays for children by Appt only Free   [facility]  [address] [place], [place] [address] [phone]    Emergencies (adults only):  [phone] Anyone Free walk-in screens for oral surgery    Call ahead for hours    All other services by appt. only  Accepts MA for pediatrics only    Rates are about 25% - 40% less than private dental office.    No sliding fee scale   [facility]  [address] [place]  [address]  [phone] Anyone as long as they do  not have health insurance Hours on Mondays, Tuesdays, and Thursdays Sliding fees based on monthly income    No root canals, tooth pulls or emergencies   [facility]  [address] [id number] [phone] Anyone  M - F 8:00 am - 5:00 pm  Wed 8:00 am - 8 pm Sliding fees; MA; Insurance   [facility] [address]  [id number] [phone] Anyone age 55+ M - F 8:00 am - 4:30 pm    Appt. only Set slightly lower fees;  MA; Insurance         Give Kids a smile day in [facility] Children Takes place in February at a few locations                          Dental Pain:      Dear Emergency Department Patient:     Here at [facility], we are always pleased to evaluate you for emergency conditions and offer a screening examination. Today, we have seen you and determined that you have dental pain and/or a dental problem.  We do not have the equipment and/or advanced training to perform definitive dental care, therefore, you need to be seen by a dentist for further care.     You may see your regular dentist if you have one, or we have attached a list of community dental providers, including some who provide care at a reduced fee.      Please be aware that if a narcotic medication was prescribed, it will not be refilled in the emergency department.  Accordingly, if you should have ongoing problems and/or pain, you should contact a dentist right away for definitive treatment.    Sincerely,        [facility])

## 2018-01-19 ENCOUNTER — HOSPITAL ENCOUNTER (EMERGENCY)
Facility: CLINIC | Age: 28
Discharge: HOME | End: 2018-01-19
Attending: EMERGENCY MEDICINE | Admitting: EMERGENCY MEDICINE
Payer: COMMERCIAL

## 2018-01-19 VITALS
SYSTOLIC BLOOD PRESSURE: 134 MMHG | HEART RATE: 77 BPM | RESPIRATION RATE: 16 BRPM | OXYGEN SATURATION: 99 % | TEMPERATURE: 97.8 F | DIASTOLIC BLOOD PRESSURE: 85 MMHG
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
## 2018-01-19 PROCEDURE — 99282 EMERGENCY DEPT VISIT SF MDM: CPT

## 2018-01-19 RX ORDER — PERMETHRIN 50 MG/G
CREAM TOPICAL
Qty: 60 G | Refills: 1 | Status: SHIPPED | OUTPATIENT
Start: 2018-01-19 | End: 2018-05-28

## 2018-01-19 RX ORDER — BENZOCAINE/MENTHOL 6 MG-10 MG
LOZENGE MUCOUS MEMBRANE 2 TIMES DAILY
Qty: 30 G | Refills: 0 | Status: SHIPPED | OUTPATIENT
Start: 2018-01-19 | End: 2018-05-28

## 2018-01-19 NOTE — ED PROVIDER NOTES
History     Chief Complaint:  Insect Bites    HPI   Blanca Coleman is a 27 year old female who presents to the emergency department for evaluation of insect bites. The patient reports going to someone's house two days ago and falling asleep in her friend's bed. Within 2 hours of waking up, she noticed itchy bug bites distributed on her neck, hands, and legs. She noticed that the bites swelled when she itched them. She denies any bites on her feet. The patient does not recall seeing any actual insect crawling in the bed.    Allergies:  Allergies reviewed. No pertinent allergies.     Medications:    Medications reviewed. No pertinent outpatient prescriptions.    Past Medical History:    History reviewed. No pertinent past medical history.    Past Surgical History:    History reviewed. No pertinent surgical history.    Family History:    Family history reviewed. No pertinent family history.    Social History:  Smoking Status: Never Smoker  Smokeless Tobacco: Unknown  Alcohol Use: No  Marital Status: Single     Review of Systems   Skin: Positive for rash (insect bites, pruritic ).   All other systems reviewed and are negative.    Physical Exam     Patient Vitals for the past 24 hrs:   BP Temp Temp src Pulse Resp SpO2   01/19/18 1326 134/85 97.8  F (36.6  C) Temporal 77 16 99 %     Physical Exam  Vital signs and nursing notes reviewed.     Constitutional: laying on gurney appears comfortable  HENT: No evidence of facial or head injury.    Eyes: Conjunctivae are normal bilaterally. Pupils equal  Neck: normal range of motion  Cardiovascular: Normal rate.    Pulmonary/Chest: No respiratory distress.   Neurological: Alert and oriented. No focal weakness  Skin: Skin is warm and dry. Multiple areas of red, raised, erythematous lesions on the right neck, hands, and wrists without vesicles. Appears to be consistent with bug bites versus scabies. No infectious type rash noted.  Psych: normal affect     Emergency Department  Course     Emergency Department Course:    Nursing notes and vitals reviewed.    I performed an exam of the patient as documented above.     I personally reviewed the physical exam results with the patient and answered all related questions prior to discharge.    Impression & Plan      Medical Decision Making:  Blanca Coleman is a 27 year old female who presents with itchy, bug bite type findings on her skin. She says she stayed at someone's house and then a couple hours later when she got home, she started having the itchy findings. On exam, it is difficult to really sort out if she has scabies or other type of insect bite. There is no significant infectious or other concerning type of rash. I recommended a trial of the permethrin and then she was given a prescription of hydrocortisone to be used on the skin except for the face, as needed for itching. She is aware about the eradication of bed bugs and the treatment for that in case this was the case. No one else in her family reports having issues. The patient was discharged home in good condition.    Diagnosis:    ICD-10-CM    1. Insect bite, initial encounter W57.XXXA      Disposition:   The patient was discharged home.     Discharge Medications:  Discharge Medication List as of 1/19/2018  2:22 PM      START taking these medications    Details   permethrin (ELIMITE) 5 % cream Apply cream from head to toe (except the face); leave on for 8-14 hours then wash off with water; reapply in 1 week if live mites appear.Disp-60 g, R-1Local Print      hydrocortisone (CORTAID) 1 % cream Apply topically 2 times dailyDisp-30 g, R-0Local Print             Scribe Disclosure:  I, Arleen Amezcua, am serving as a scribe at 1:38 PM on 1/19/2018 to document services personally performed by Levon Soni MD based on my observations and the provider's statements to me.    [facility] EMERGENCY DEPARTMENT       Levon Soni MD  01/19/18 0738

## 2018-01-19 NOTE — ED NOTES
Arrive to ED reports she may have stayed at someones house that has bed bugs small bites noted on body with itching. A/ox 3.

## 2018-01-19 NOTE — DISCHARGE INSTRUCTIONS
Bedbugs    After years of being very rare in the , bedbugs are making a comeback. These bugs are small, about the size of an apple seed. They are reddish-brown, oval, and look slightly flattened. Bedbugs feed on human and animal blood, usually at night during sleep. Bedbugs are a nuisance. But they are not a major threat to your health.  Facts about bedbugs    Bedbugs are active mainly at night. During the day, they hide in dark places, often in and around where people or animals sleep. They are commonly found on mattresses and boxsprings and behind headboards. But they can hide anywhere.    Bedbugs are small and hard to see. They are often carried from place to place in items like luggage, furniture, and clothing. This is why they spread so easily.    Bedbugs are not attracted to dirt. Even the cleanest house or hotel can have bedbugs.    Unlike mosquitoes, bedbugs do not transmit disease. If you are bitten, you do not have to worry about catching a blood-borne illness.    Insect repellents have little effect on bedbugs.    Adult bedbugs can live for several months without a blood feeding.    Bedbugs are very hard to get rid of. If an infestation is suspected, it is recommended that a professional  be called.  Signs of bedbugs  Bites can be the first sign of a bedbug infestation. When inspecting for the bugs, look in crevices of mattresses and box springs, behind the headboard, and in and on objects near or under the bed. You may see the bugs themselves. Or, you may see tiny dark stains on fabric or carpets. Smears of blood on sheets and nightclothes upon awakening are another sign. In some cases, the bugs are so well hidden they can t be found unless items are taken apart.  Bedbug bites  Bedbugs look for food at night. They bite while people or animals are sleeping. The bites are most often painless. Many people never know they ve been bitten. But some people develop an itchy red welt or swelling.  And if a person has an allergic reaction, severe itching, blisters, or hives can develop. Bites are often on areas that are exposed, such as the head, neck, arms, and hands. Bedbug bites are not dangerous and don t spread illness. But if the bite is scratched and the skin is broken and irritated, there is a chance that a skin infection can develop.  Treating bites  Bite symptoms usually go away on their own within a week or two. During this time, over-the-counter (OTC) hydrocortisone ointment or cream can help relieve itching and swelling. If itching is bad, an OTC antihistamine that s taken by mouth (oral) can help. If an infection develops from scratching the bites, your healthcare provider can prescribe an antibiotic.  If you were bitten by bedbugs in your home, talk to a licensed pest-control professional or company. They can inspect your home and help you get rid of the bugs safely.  When to call your healthcare provider   If you have bites, call your healthcare provider if you develop any of the following:    A fever of 100.4 F (38 C) or higher, or as directed by your provider    Signs of infection of the bites, such as increased swelling and pain, warmth, or oozing    Signs of allergic reaction, such as hives, spreading rash, throat itching or swelling, or wheezing   Avoiding bedbugs    Avoid buying used beds. But if you do buy used bed frames, mattresses, box springs, or other furniture, check them carefully for bedbugs before bringing them into your home.    If bedbugs are found or suspected in the bed, use mattress and box spring encasement covers that can seal in bedbugs so they will eventually die there.    When traveling, remove linens from the top of the bed and check the mattress and headboard for signs of the bugs. Place luggage on a hard surface such as a table or on a luggage rack and not on the floor.    If you think you were exposed to bedbugs while traveling, wash all clothing in hot water as  soon as you get home. Washing alone will not kill the bugs. Clothing must be put in a dryer at high temperatures, at least 113  F (45  C) for 1 hour.     Never  items discarded on the street for use in your home. These include bed frames, mattresses, box springs, or upholstered furniture. These items may carry bedbugs.     Date Last Reviewed: 3/1/2017    7474-4767 [employer]. [address] [place] [address]. All rights reserved. This information is not intended as a substitute for professional medical care. Always follow your healthcare professional's instructions.

## 2018-01-19 NOTE — ED AVS SNAPSHOT
[facility] Emergency Department    [address]    [facility] [address]    Phone:  [phone]    Fax:  [phone]                                       Blanca Coleman   MRN: [id number]    Department:  [facility] Emergency Department   Date of Visit:  1/19/2018           After Visit Summary Signature Page     I have received my discharge instructions, and my questions have been answered. I have discussed any challenges I see with this plan with the nurse or doctor.    ..........................................................................................................................................  Patient/Patient Representative Signature      ..........................................................................................................................................  Patient Representative Print Name and Relationship to Patient    ..................................................               ................................................  Date                                            Time    ..........................................................................................................................................  Reviewed by Signature/Title    ...................................................              ..............................................  Date                                                            Time

## 2018-01-19 NOTE — ED AVS SNAPSHOT
[facility] Emergency Department    [address]    [facility] [address]    Phone:  [phone]    Fax:  [phone]                                       Blanca Coleman   MRN: [id number]    Department:  [facility] Emergency Department   Date of Visit:  1/19/2018           Patient Information     Date Of Birth          1990        Your diagnoses for this visit were:     Insect bite, initial encounter        You were seen by Levon Soni MD.      Follow-up Information     Follow up with Clinic, [facility].    Specialty:  Internal Medicine    Contact information:    [address]  [place] [address]  [phone]          Discharge Instructions         Bedbugs    After years of being very rare in the , bedbugs are making a comeback. These bugs are small, about the size of an apple seed. They are reddish-brown, oval, and look slightly flattened. Bedbugs feed on human and animal blood, usually at night during sleep. Bedbugs are a nuisance. But they are not a major threat to your health.  Facts about bedbugs    Bedbugs are active mainly at night. During the day, they hide in dark places, often in and around where people or animals sleep. They are commonly found on mattresses and boxsprings and behind headboards. But they can hide anywhere.    Bedbugs are small and hard to see. They are often carried from place to place in items like luggage, furniture, and clothing. This is why they spread so easily.    Bedbugs are not attracted to dirt. Even the cleanest house or hotel can have bedbugs.    Unlike mosquitoes, bedbugs do not transmit disease. If you are bitten, you do not have to worry about catching a blood-borne illness.    Insect repellents have little effect on bedbugs.    Adult bedbugs can live for several months without a blood feeding.    Bedbugs are very hard to get rid of. If an infestation is suspected, it is recommended that a  professional  be called.  Signs of bedbugs  Bites can be the first sign of a bedbug infestation. When inspecting for the bugs, look in crevices of mattresses and box springs, behind the headboard, and in and on objects near or under the bed. You may see the bugs themselves. Or, you may see tiny dark stains on fabric or carpets. Smears of blood on sheets and nightclothes upon awakening are another sign. In some cases, the bugs are so well hidden they can t be found unless items are taken apart.  Bedbug bites  Bedbugs look for food at night. They bite while people or animals are sleeping. The bites are most often painless. Many people never know they ve been bitten. But some people develop an itchy red welt or swelling. And if a person has an allergic reaction, severe itching, blisters, or hives can develop. Bites are often on areas that are exposed, such as the head, neck, arms, and hands. Bedbug bites are not dangerous and don t spread illness. But if the bite is scratched and the skin is broken and irritated, there is a chance that a skin infection can develop.  Treating bites  Bite symptoms usually go away on their own within a week or two. During this time, over-the-counter (OTC) hydrocortisone ointment or cream can help relieve itching and swelling. If itching is bad, an OTC antihistamine that s taken by mouth (oral) can help. If an infection develops from scratching the bites, your healthcare provider can prescribe an antibiotic.  If you were bitten by bedbugs in your home, talk to a licensed pest-control professional or company. They can inspect your home and help you get rid of the bugs safely.  When to call your healthcare provider   If you have bites, call your healthcare provider if you develop any of the following:    A fever of 100.4 F (38 C) or higher, or as directed by your provider    Signs of infection of the bites, such as increased swelling and pain, warmth, or oozing    Signs of  allergic reaction, such as hives, spreading rash, throat itching or swelling, or wheezing   Avoiding bedbugs    Avoid buying used beds. But if you do buy used bed frames, mattresses, box springs, or other furniture, check them carefully for bedbugs before bringing them into your home.    If bedbugs are found or suspected in the bed, use mattress and box spring encasement covers that can seal in bedbugs so they will eventually die there.    When traveling, remove linens from the top of the bed and check the mattress and headboard for signs of the bugs. Place luggage on a hard surface such as a table or on a luggage rack and not on the floor.    If you think you were exposed to bedbugs while traveling, wash all clothing in hot water as soon as you get home. Washing alone will not kill the bugs. Clothing must be put in a dryer at high temperatures, at least 113  F (45  C) for 1 hour.     Never  items discarded on the street for use in your home. These include bed frames, mattresses, box springs, or upholstered furniture. These items may carry bedbugs.     Date Last Reviewed: 3/1/2017    7286-4725 [employer]. [address] [place] [address], [place] [address]. All rights reserved. This information is not intended as a substitute for professional medical care. Always follow your healthcare professional's instructions.          Discharge References/Attachments     SCABIES (ENGLISH)      24 Hour Appointment Hotline       To make an appointment at any [facility] clinic, call [phone] ([phone]). If you don't have a family doctor or clinic, we will help you find one. [facility] clinics are conveniently located to serve the needs of you and your family.             Review of your medicines      START taking        Dose / Directions Last dose taken    hydrocortisone 1 % cream   Commonly known as:  CORTAID   Quantity:  30 g        Apply topically 2 times daily   Refills:  0        permethrin 5 % cream    Commonly known as:  ELIMITE   Quantity:  60 g        Apply cream from head to toe (except the face); leave on for 8-14 hours then wash off with water; reapply in 1 week if live mites appear.   Refills:  1                Prescriptions were sent or printed at these locations (2 Prescriptions)                   Other Prescriptions                Printed at Department/Unit printer (2 of 2)         permethrin (ELIMITE) 5 % cream               hydrocortisone (CORTAID) 1 % cream                Orders Needing Specimen Collection     None      Pending Results     No orders found from 1/17/2018 to 1/20/2018.            Pending Culture Results     No orders found from 1/17/2018 to 1/20/2018.            Pending Results Instructions     If you had any lab results that were not finalized at the time of your Discharge, you can call the ED Lab Result RN at [phone]. You will be contacted by this team for any positive Lab results or changes in treatment. The nurses are available 7 days a week from 10A to 6:30P.  You can leave a message 24 hours per day and they will return your call.        Test Results From Your Hospital Stay               Clinical Quality Measure: Blood Pressure Screening     Your blood pressure was checked while you were in the emergency department today. The last reading we obtained was  BP: 134/85 . Please read the guidelines below about what these numbers mean and what you should do about them.  If your systolic blood pressure (the top number) is less than 120 and your diastolic blood pressure (the bottom number) is less than 80, then your blood pressure is normal. There is nothing more that you need to do about it.  If your systolic blood pressure (the top number) is 120-139 or your diastolic blood pressure (the bottom number) is 80-89, your blood pressure may be higher than it should be. You should have your blood pressure rechecked within a year by a primary care provider.  If your systolic blood  "pressure (the top number) is 140 or greater or your diastolic blood pressure (the bottom number) is 90 or greater, you may have high blood pressure. High blood pressure is treatable, but if left untreated over time it can put you at risk for heart attack, stroke, or kidney failure. You should have your blood pressure rechecked by a primary care provider within the next 4 weeks.  If your provider in the emergency department today gave you specific instructions to follow-up with your doctor or provider even sooner than that, you should follow that instruction and not wait for up to 4 weeks for your follow-up visit.        Thank you for choosing [facility]       Thank you for choosing [facility] for your care. Our goal is always to provide you with excellent care. Hearing back from our patients is one way we can continue to improve our services. Please take a few minutes to complete the written survey that you may receive in the mail after you visit with us. Thank you!        [employer] Information     [employer] lets you send messages to your doctor, view your test results, renew your prescriptions, schedule appointments and more. To sign up, go to [email or web address] . Click on \"Log in\" on the left side of the screen, which will take you to the Welcome page. Then click on \"Sign up Now\" on the right side of the page.     You will be asked to enter the access code listed below, as well as some personal information. Please follow the directions to create your username and password.     Your access code is: [email or web address]  Expires: 2018  2:22 PM     Your access code will  in 90 days. If you need help or a new code, please call your [facility] clinic or [phone].        Care EveryWhere ID     This is your Care EveryWhere ID. This could be used by other organizations to access your [facility] medical records  [id number]        Equal Access to Services     ESPERANZA MAC : gamaliel Gonzalez, " baljinder bateman ah. So [facility] [phone].    ATENCIÓN: Si habla cliveañol, tiene a ramirez disposición servicios gratuitos de asistencia lingüística. Llame al [phone].    We comply with applicable federal civil rights laws and [place] laws. We do not discriminate on the basis of race, color, national origin, age, disability, sex, sexual orientation, or gender identity.            After Visit Summary       This is your record. Keep this with you and show to your community pharmacist(s) and doctor(s) at your next visit.

## 2018-05-28 ENCOUNTER — HOSPITAL ENCOUNTER (EMERGENCY)
Facility: CLINIC | Age: 28
Discharge: HOME | End: 2018-05-28
Attending: EMERGENCY MEDICINE | Admitting: EMERGENCY MEDICINE
Payer: COMMERCIAL

## 2018-05-28 VITALS
DIASTOLIC BLOOD PRESSURE: 92 MMHG | TEMPERATURE: 98.1 F | RESPIRATION RATE: 16 BRPM | WEIGHT: 130 LBS | BODY MASS INDEX: 24.55 KG/M2 | OXYGEN SATURATION: 98 % | HEIGHT: 61 IN | SYSTOLIC BLOOD PRESSURE: 129 MMHG

## 2018-05-28 DIAGNOSIS — K04.01: ICD-10-CM

## 2018-05-28 PROCEDURE — 99283 EMERGENCY DEPT VISIT LOW MDM: CPT

## 2018-05-28 PROCEDURE — 25000132 ZZH RX MED GY IP 250 OP 250 PS 637: Performed by: EMERGENCY MEDICINE

## 2018-05-28 RX ORDER — TRAMADOL HYDROCHLORIDE 50 MG/1
50 TABLET ORAL ONCE
Status: COMPLETED | OUTPATIENT
Start: 2018-05-28 | End: 2018-05-28

## 2018-05-28 RX ORDER — NAPROXEN 500 MG/1
500 TABLET ORAL 2 TIMES DAILY WITH MEALS
Qty: 16 TABLET | Refills: 0 | Status: SHIPPED | OUTPATIENT
Start: 2018-05-28 | End: 2018-06-05

## 2018-05-28 RX ORDER — TRAMADOL HYDROCHLORIDE 50 MG/1
50 TABLET ORAL EVERY 6 HOURS PRN
Qty: 10 TABLET | Refills: 0 | Status: SHIPPED | OUTPATIENT
Start: 2018-05-28 | End: 2021-03-01

## 2018-05-28 RX ORDER — PENICILLIN V POTASSIUM 500 MG/1
500 TABLET, FILM COATED ORAL 4 TIMES DAILY
Qty: 28 TABLET | Refills: 0 | Status: SHIPPED | OUTPATIENT
Start: 2018-05-28 | End: 2018-06-04

## 2018-05-28 RX ADMIN — TRAMADOL HYDROCHLORIDE 50 MG: 50 TABLET, COATED ORAL at 12:15

## 2018-05-28 ASSESSMENT — ENCOUNTER SYMPTOMS
TROUBLE SWALLOWING: 0
FEVER: 0
NAUSEA: 0
FACIAL SWELLING: 1
VOMITING: 0

## 2018-05-28 NOTE — DISCHARGE INSTRUCTIONS
Discharge Instructions  Dental Pain    You have been seen today for a toothache. Your pain may be caused by an exposed nerve, an infection (pulpitis), a root abscess (pocket of pus), or other problems. You will need to see a dentist for a solution to your tooth problem. Emergency Department care is only to help control your problem until you can see a dentist; we cannot provide complete dental care.  Today, we did not find any sign that your toothache was caused by any dangerous or life-threatening condition, but sometimes symptoms develop over time and cannot be found during an emergency visit, so it is very important that you follow up with your dentist.      Generally, every Emergency Department visit should have a follow-up clinic visit with either a primary or a specialty clinic/provider. Please follow-up as instructed by your emergency provider today.    Return to the Emergency Department if:    You develop a new fever over 100.4 F.    You cannot open your mouth normally, cannot move your tongue well, or cannot swallow.    You have new or increased swelling of your face or neck.    You develop drainage of pus or foul smelling material from around your tooth.  What can I do to help myself?    Take any antibiotic the provider may have prescribed for you today.    Avoid very hot or very cold foods as both can cause pain.    Make an appointment to see a dentist as soon as possible. Dentists are generally not  on-staff  at hospitals so we cannot  refer  to you to dentist but we may be able to provide a list of dental clinics to help you.  If you were given a prescription for medicine here today, be sure to read all of the information (including the package insert) that comes with your prescription.  This will include important information about the medicine, its side effects, and any warnings that you need to know about.  The pharmacist who fills the prescription can provide more information and answer questions  you may have about the medicine.  If you have questions or concerns that the pharmacist cannot address, please call or return to the Emergency Department.   Remember that you can always come back to the Emergency Department if you are not able to see your regular provider in the amount of time listed above, if you get any new symptoms, or if there is anything that worries you.

## 2018-05-28 NOTE — ED AVS SNAPSHOT
[facility] Emergency Department    [address]    [facility] [address]    Phone:  [phone]    Fax:  [phone]                                       Blanca Coleman   MRN: [id number]    Department:  [facility] Emergency Department   Date of Visit:  5/28/2018           After Visit Summary Signature Page     I have received my discharge instructions, and my questions have been answered. I have discussed any challenges I see with this plan with the nurse or doctor.    ..........................................................................................................................................  Patient/Patient Representative Signature      ..........................................................................................................................................  Patient Representative Print Name and Relationship to Patient    ..................................................               ................................................  Date                                            Time    ..........................................................................................................................................  Reviewed by Signature/Title    ...................................................              ..............................................  Date                                                            Time

## 2018-05-28 NOTE — ED PROVIDER NOTES
"  History     Chief Complaint:  Dental Pain     HPI   Blanca Coleman is a 27 year old female who presents for evaluation of dental pain. Yesterday evening, the patient started to develop pain at a previously broken left lower tooth with some swelling in her left cheek. Due to her persistent pain this morning, the patient came into the ED for evaluation. She did take Tylenol shortly prior to arrival without improvement, and she currently rates her pain at a severity of 8/10. She has not had any fever, difficulty swallowing, nausea, or vomiting recently. She has not yet set up a dental appointment regarding this.     Allergies:  NKDA      Medications:    The patient is not currently taking any prescribed medications.      Past Medical History:    The patient denies any relevant past medical history.     Past Surgical History:    Jaw surgery     Family History:    History reviewed. No pertinent family history.     Social History:  Tobacco use:    Never smoker  Alcohol use:    Negative  Marital status:    Single   Accompanied to ED by:  Alone     Review of Systems   Constitutional: Negative for fever.   HENT: Positive for dental problem (left lower dental pain) and facial swelling (left cheek). Negative for trouble swallowing.    Gastrointestinal: Negative for nausea and vomiting.   All other systems reviewed and are negative.    Physical Exam   First Vitals:  BP: (!) 129/92  Heart Rate: 86  Temp: 98.1  F (36.7  C)  Resp: 16  Height: 154.9 cm (5' 1\")  Weight: 59 kg (130 lb)  SpO2: 98 %    Physical Exam  General: Well appearing, nontoxic. Resting comfortably  Head:  Scalp, face, and head appear normal  Eyes:  Pupils equal, round, and reactive to light    Conjunctivae noninjected and sclera white  ENT:    The nose is normal    Ears/pinnae are normal. Mild soft tissue swelling and tenderness to palpation over the left angle of the mandible. No cervical lymphadenopathy. Left mandibular 3rd molar with significant decay and " fracture of the tooth. Tenderness to percussion. No associated swelling or fluctuance. MMM. Posterior pharynx clear without swelling, exudates or erythema. No mucosal lesions, tongue or lip swelling. No tongue elevation. Uvula normal without deviation. Voice normal. No drooling or trismus.   Neck:  Normal range of motion  CV:  RRR, no M/R/G  Resp:  CTAB, no increased WOB  MSK:  Normal tone  Skin:  No rash or lesions noted.  Neuro: No facial droop. SILT over bilateral face. Speech is normal and fluent    Moves all extremities spontaneously  Psych:  Awake, Alert. Normal affect      Appropriate interactions           Emergency Department Course     Interventions:  1215 Tramadol 50 mg PO     Emergency Department Course:  Nursing notes and vitals reviewed.  1152: I performed an exam of the patient as documented above.     Findings and plan explained to the Patient. Patient discharged home with instructions regarding supportive care, medications, and reasons to return. The importance of close follow-up was reviewed. The patient was prescribed Tramadol, Naproxen, and Veetid.      Impression & Plan      Medical Decision Making:  Blanca Coleman is a 27 year old female who presents with a tooth ache.  There is no abscess detected around the tooth amenable to incision and drainage.  The differential diagnosis includes: cracked tooth syndrome, pulpitis, sub-apical abscess, amongst others.  There is no evidence of buccinator/canine space infections, significant facial swelling, or Reinier's angina. There are no posterior pharyngeal deep space infections detected.  Follow up with a dentist/endodontist in the coming days is indicated for further work up and treatment. Will start patient on penicillin and analgesia. I stressed the importance of dental follow up. Return precautions were discussed with patient. The patient's questions were answered and the patient was agreeable with discharge.      Diagnosis:    ICD-10-CM   1. 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     Discharge Medications:   Details   naproxen (NAPROSYN) 500 MG tablet Take 1 tablet (500 mg) by mouth 2 times daily (with meals) for 8 days, Disp-16 tablet, R-0, Local Print      penicillin V potassium (VEETID) 500 MG tablet Take 1 tablet (500 mg) by mouth 4 times daily for 7 days, Disp-28 tablet, R-0, Local Print      traMADol (ULTRAM) 50 MG tablet Take 1 tablet (50 mg) by mouth every 6 hours as needed for severe pain, Disp-10 tablet, R-0, Local Print             Crispin ORELLANA, kevin serving as a scribe at 11:52 AM on 5/28/2018 to document services personally performed by Dr. Dos Santos, based on my observations and the provider's statements to me.    [facility] EMERGENCY DEPARTMENT       Jesus Dos Santos MD  05/28/18 [id number]

## 2018-05-28 NOTE — ED TRIAGE NOTES
A&Ox4. ABC's intact. Pt c/o left lower jaw pain that started last night.  Took tylenol PTA, but states it is not working.  No dental appt set at this time.

## 2018-05-28 NOTE — ED AVS SNAPSHOT
[facility] Emergency Department    [address] [place] [address]    Phone:  [phone]    Fax:  [phone]                                       Blanca Coleman   MRN: [id number]    Department:  [facility] Emergency Department   Date of Visit:  5/28/2018           Patient Information     Date Of Birth          1990        Your diagnoses for this visit were:     Acute exacerbation of chronic pulpitis        You were seen by Jesus Dos Santos MD.      Follow-up Information     Schedule an appointment as soon as possible for a visit with Dentist.    Why:  As soon as possible    Contact information:    Schedule a follow up appointment with a dentist for final care of the tooth.        Discharge Instructions       Discharge Instructions  Dental Pain    You have been seen today for a toothache. Your pain may be caused by an exposed nerve, an infection (pulpitis), a root abscess (pocket of pus), or other problems. You will need to see a dentist for a solution to your tooth problem. Emergency Department care is only to help control your problem until you can see a dentist; we cannot provide complete dental care.  Today, we did not find any sign that your toothache was caused by any dangerous or life-threatening condition, but sometimes symptoms develop over time and cannot be found during an emergency visit, so it is very important that you follow up with your dentist.      Generally, every Emergency Department visit should have a follow-up clinic visit with either a primary or a specialty clinic/provider. Please follow-up as instructed by your emergency provider today.    Return to the Emergency Department if:    You develop a new fever over 100.4 F.    You cannot open your mouth normally, cannot move your tongue well, or cannot swallow.    You have new or increased swelling of your face or neck.    You develop drainage of pus or foul smelling material from around your  tooth.  What can I do to help myself?    Take any antibiotic the provider may have prescribed for you today.    Avoid very hot or very cold foods as both can cause pain.    Make an appointment to see a dentist as soon as possible. Dentists are generally not  on-staff  at hospitals so we cannot  refer  to you to dentist but we may be able to provide a list of dental clinics to help you.  If you were given a prescription for medicine here today, be sure to read all of the information (including the package insert) that comes with your prescription.  This will include important information about the medicine, its side effects, and any warnings that you need to know about.  The pharmacist who fills the prescription can provide more information and answer questions you may have about the medicine.  If you have questions or concerns that the pharmacist cannot address, please call or return to the Emergency Department.   Remember that you can always come back to the Emergency Department if you are not able to see your regular provider in the amount of time listed above, if you get any new symptoms, or if there is anything that worries you.      24 Hour Appointment Hotline       To make an appointment at any [facility], call [phone] ([phone]). If you don't have a family doctor or clinic, we will help you find one. [facility] clinics are conveniently located to serve the needs of you and your family.             Review of your medicines      START taking        Dose / Directions Last dose taken    naproxen 500 MG tablet   Commonly known as:  NAPROSYN   Dose:  500 mg   Quantity:  16 tablet        Take 1 tablet (500 mg) by mouth 2 times daily (with meals) for 8 days   Refills:  0        penicillin V potassium 500 MG tablet   Commonly known as:  VEETID   Dose:  500 mg   Quantity:  28 tablet        Take 1 tablet (500 mg) by mouth 4 times daily for 7 days   Refills:  0        traMADol 50 MG tablet   Commonly  known as:  ULTRAM   Dose:  50 mg   Quantity:  10 tablet        Take 1 tablet (50 mg) by mouth every 6 hours as needed for severe pain   Refills:  0                Information about OPIOIDS     PRESCRIPTION OPIOIDS: WHAT YOU NEED TO KNOW   You have a prescription for an opioid (narcotic) pain medicine. Opioids can cause addiction. If you have a history of chemical dependency of any type, you are at a higher risk of becoming addicted to opioids. Only take this medicine after all other options have been tried. Take it for as short a time and as few doses as possible.     Do not:    Drive. If you drive while taking these medicines, you could be arrested for driving under the influence (DUI).    Operate heavy machinery    Do any other dangerous activities while taking these medicines.     Drink any alcohol while taking these medicines.      Take with any other medicines that contain acetaminophen. Read all labels carefully. Look for the word  acetaminophen  or  Tylenol.  Ask your pharmacist if you have questions or are unsure.    Store your pills in a secure place, locked if possible. We will not replace any lost or stolen medicine. If you don t finish your medicine, please throw away (dispose) as directed by your pharmacist. The [place] [employer]ution [employer] has more information about safe disposal: https://www.pca.[facility].[place].us/living-green/managing-unwanted-medications    All opioids tend to cause constipation. Drink plenty of water and eat foods that have a lot of fiber, such as fruits, vegetables, prune juice, apple juice and high-fiber cereal. Take a laxative (Miralax, milk of magnesia, Colace, Senna) if you don t move your bowels at least every other day.         Prescriptions were sent or printed at these locations (3 Prescriptions)                   Other Prescriptions                Printed at Department/Unit printer (3 of 3)         naproxen (NAPROSYN) 500 MG tablet               penicillin V potassium  (VEETID) 500 MG tablet               traMADol (ULTRAM) 50 MG tablet                Orders Needing Specimen Collection     None      Pending Results     No orders found from 5/26/2018 to 5/29/2018.            Pending Culture Results     No orders found from 5/26/2018 to 5/29/2018.            Pending Results Instructions     If you had any lab results that were not finalized at the time of your Discharge, you can call the ED Lab Result RN at [phone]. You will be contacted by this team for any positive Lab results or changes in treatment. The nurses are available 7 days a week from 10A to 6:30P.  You can leave a message 24 hours per day and they will return your call.        Test Results From Your Hospital Stay               Clinical Quality Measure: Blood Pressure Screening     Your blood pressure was checked while you were in the emergency department today. The last reading we obtained was  BP: (!) 129/92 . Please read the guidelines below about what these numbers mean and what you should do about them.  If your systolic blood pressure (the top number) is less than 120 and your diastolic blood pressure (the bottom number) is less than 80, then your blood pressure is normal. There is nothing more that you need to do about it.  If your systolic blood pressure (the top number) is 120-139 or your diastolic blood pressure (the bottom number) is 80-89, your blood pressure may be higher than it should be. You should have your blood pressure rechecked within a year by a primary care provider.  If your systolic blood pressure (the top number) is 140 or greater or your diastolic blood pressure (the bottom number) is 90 or greater, you may have high blood pressure. High blood pressure is treatable, but if left untreated over time it can put you at risk for heart attack, stroke, or kidney failure. You should have your blood pressure rechecked by a primary care provider within the next 4 weeks.  If your provider in the  "emergency department today gave you specific instructions to follow-up with your doctor or provider even sooner than that, you should follow that instruction and not wait for up to 4 weeks for your follow-up visit.        Thank you for choosing [facility]       Thank you for choosing [facility] for your care. Our goal is always to provide you with excellent care. Hearing back from our patients is one way we can continue to improve our services. Please take a few minutes to complete the written survey that you may receive in the mail after you visit with us. Thank you!        [employer] Information     [employer] lets you send messages to your doctor, view your test results, renew your prescriptions, schedule appointments and more. To sign up, go to [email or web address] . Click on \"Log in\" on the left side of the screen, which will take you to the Welcome page. Then click on \"Sign up Now\" on the right side of the page.     You will be asked to enter the access code listed below, as well as some personal information. Please follow the directions to create your username and password.     Your access code is: [email or web address]  Expires: 2018 12:16 PM     Your access code will  in 90 days. If you need help or a new code, please call your [facility] clinic or [phone].        Care EveryWhere ID     This is your Care EveryWhere ID. This could be used by other organizations to access your [facility] medical records  [id number]        Equal Access to Services     ESPERANZA MAC : Hadii santhosh barbao Sonahun, waaxda luqadaha, qaybta kaalmada adetishayada, baljinder zamudio . So [facility] [phone].    ATENCIÓN: Si habla español, tiene a ramirez disposición servicios gratuitos de asistencia lingüística. Ricardo al [phone].    We comply with applicable federal civil rights laws and [place] laws. We do not discriminate on the basis of race, color, national origin, age, disability, sex, sexual orientation, or " gender identity.            After Visit Summary       This is your record. Keep this with you and show to your community pharmacist(s) and doctor(s) at your next visit.

## 2018-08-15 ENCOUNTER — RECORDS - HEALTHEAST (OUTPATIENT)
Dept: ADMINISTRATIVE | Facility: OTHER | Age: 28
End: 2018-08-15

## 2020-09-13 ENCOUNTER — HOSPITAL ENCOUNTER (EMERGENCY)
Facility: CLINIC | Age: 30
Discharge: HOME | End: 2020-09-13
Attending: EMERGENCY MEDICINE | Admitting: EMERGENCY MEDICINE
Payer: COMMERCIAL

## 2020-09-13 VITALS
TEMPERATURE: 99.1 F | DIASTOLIC BLOOD PRESSURE: 86 MMHG | WEIGHT: 130 LBS | OXYGEN SATURATION: 99 % | SYSTOLIC BLOOD PRESSURE: 123 MMHG | RESPIRATION RATE: 20 BRPM | HEART RATE: 79 BPM

## 2020-09-13 DIAGNOSIS — N10 PYELONEPHRITIS, ACUTE: ICD-10-CM

## 2020-09-13 LAB
ALBUMIN UR-MCNC: 30 MG/DL
ANION GAP SERPL CALCULATED.3IONS-SCNC: 6 MMOL/L (ref 3–14)
APPEARANCE UR: ABNORMAL
BACTERIA #/AREA URNS HPF: ABNORMAL /HPF
BASOPHILS # BLD AUTO: 0 10E9/L (ref 0–0.2)
BASOPHILS NFR BLD AUTO: 0.4 %
BILIRUB UR QL STRIP: NEGATIVE
BUN SERPL-MCNC: 8 MG/DL (ref 7–30)
CALCIUM SERPL-MCNC: 8 MG/DL (ref 8.5–10.1)
CHLORIDE SERPL-SCNC: 98 MMOL/L (ref 94–109)
CO2 SERPL-SCNC: 30 MMOL/L (ref 20–32)
COLOR UR AUTO: YELLOW
CREAT SERPL-MCNC: 0.84 MG/DL (ref 0.52–1.04)
DIFFERENTIAL METHOD BLD: ABNORMAL
EOSINOPHIL # BLD AUTO: 0 10E9/L (ref 0–0.7)
EOSINOPHIL NFR BLD AUTO: 0 %
ERYTHROCYTE [DISTWIDTH] IN BLOOD BY AUTOMATED COUNT: 11.9 % (ref 10–15)
GFR SERPL CREATININE-BSD FRML MDRD: >90 ML/MIN/{1.73_M2}
GLUCOSE SERPL-MCNC: 124 MG/DL (ref 70–99)
GLUCOSE UR STRIP-MCNC: NEGATIVE MG/DL
HCG UR QL: NEGATIVE
HCT VFR BLD AUTO: 39.5 % (ref 35–47)
HGB BLD-MCNC: 12.9 G/DL (ref 11.7–15.7)
HGB UR QL STRIP: ABNORMAL
IMM GRANULOCYTES # BLD: 0 10E9/L (ref 0–0.4)
IMM GRANULOCYTES NFR BLD: 0 %
KETONES UR STRIP-MCNC: 20 MG/DL
LACTATE BLD-SCNC: 1.2 MMOL/L (ref 0.7–2)
LEUKOCYTE ESTERASE UR QL STRIP: ABNORMAL
LYMPHOCYTES # BLD AUTO: 0.5 10E9/L (ref 0.8–5.3)
LYMPHOCYTES NFR BLD AUTO: 20.9 %
MCH RBC QN AUTO: 31.5 PG (ref 26.5–33)
MCHC RBC AUTO-ENTMCNC: 32.7 G/DL (ref 31.5–36.5)
MCV RBC AUTO: 97 FL (ref 78–100)
MONOCYTES # BLD AUTO: 0.3 10E9/L (ref 0–1.3)
MONOCYTES NFR BLD AUTO: 11.5 %
MUCOUS THREADS #/AREA URNS LPF: PRESENT /LPF
NEUTROPHILS # BLD AUTO: 1.7 10E9/L (ref 1.6–8.3)
NEUTROPHILS NFR BLD AUTO: 67.2 %
NITRATE UR QL: NEGATIVE
NRBC # BLD AUTO: 0 10*3/UL
NRBC BLD AUTO-RTO: 0 /100
PH UR STRIP: 6 PH (ref 5–7)
PLATELET # BLD AUTO: 147 10E9/L (ref 150–450)
POTASSIUM SERPL-SCNC: 2.8 MMOL/L (ref 3.4–5.3)
RBC # BLD AUTO: 4.09 10E12/L (ref 3.8–5.2)
RBC #/AREA URNS AUTO: 6 /HPF (ref 0–2)
SODIUM SERPL-SCNC: 134 MMOL/L (ref 133–144)
SOURCE: ABNORMAL
SP GR UR STRIP: 1.02 (ref 1–1.03)
SQUAMOUS #/AREA URNS AUTO: 8 /HPF (ref 0–1)
UROBILINOGEN UR STRIP-MCNC: NORMAL MG/DL (ref 0–2)
WBC # BLD AUTO: 2.5 10E9/L (ref 4–11)
WBC #/AREA URNS AUTO: 106 /HPF (ref 0–5)

## 2020-09-13 PROCEDURE — 96365 THER/PROPH/DIAG IV INF INIT: CPT

## 2020-09-13 PROCEDURE — 25000128 H RX IP 250 OP 636: Performed by: EMERGENCY MEDICINE

## 2020-09-13 PROCEDURE — 25000132 ZZH RX MED GY IP 250 OP 250 PS 637: Performed by: EMERGENCY MEDICINE

## 2020-09-13 PROCEDURE — 96368 THER/DIAG CONCURRENT INF: CPT

## 2020-09-13 PROCEDURE — 96375 TX/PRO/DX INJ NEW DRUG ADDON: CPT

## 2020-09-13 PROCEDURE — 80048 BASIC METABOLIC PNL TOTAL CA: CPT | Performed by: EMERGENCY MEDICINE

## 2020-09-13 PROCEDURE — 81025 URINE PREGNANCY TEST: CPT | Performed by: EMERGENCY MEDICINE

## 2020-09-13 PROCEDURE — 83605 ASSAY OF LACTIC ACID: CPT | Performed by: EMERGENCY MEDICINE

## 2020-09-13 PROCEDURE — 87040 BLOOD CULTURE FOR BACTERIA: CPT | Performed by: EMERGENCY MEDICINE

## 2020-09-13 PROCEDURE — 81001 URINALYSIS AUTO W/SCOPE: CPT | Performed by: EMERGENCY MEDICINE

## 2020-09-13 PROCEDURE — 25800030 ZZH RX IP 258 OP 636: Performed by: EMERGENCY MEDICINE

## 2020-09-13 PROCEDURE — 36415 COLL VENOUS BLD VENIPUNCTURE: CPT | Performed by: EMERGENCY MEDICINE

## 2020-09-13 PROCEDURE — 99285 EMERGENCY DEPT VISIT HI MDM: CPT | Mod: 25

## 2020-09-13 PROCEDURE — 85025 COMPLETE CBC W/AUTO DIFF WBC: CPT | Performed by: EMERGENCY MEDICINE

## 2020-09-13 PROCEDURE — 96361 HYDRATE IV INFUSION ADD-ON: CPT

## 2020-09-13 RX ORDER — KETOROLAC TROMETHAMINE 15 MG/ML
15 INJECTION, SOLUTION INTRAMUSCULAR; INTRAVENOUS ONCE
Status: COMPLETED | OUTPATIENT
Start: 2020-09-13 | End: 2020-09-13

## 2020-09-13 RX ORDER — POTASSIUM CL/LIDO/0.9 % NACL 10MEQ/0.1L
10 INTRAVENOUS SOLUTION, PIGGYBACK (ML) INTRAVENOUS
Status: DISCONTINUED | OUTPATIENT
Start: 2020-09-13 | End: 2020-09-13 | Stop reason: HOSPADM

## 2020-09-13 RX ORDER — CEFTRIAXONE 2 G/1
2 INJECTION, POWDER, FOR SOLUTION INTRAMUSCULAR; INTRAVENOUS ONCE
Status: COMPLETED | OUTPATIENT
Start: 2020-09-13 | End: 2020-09-13

## 2020-09-13 RX ORDER — POLYETHYLENE GLYCOL 3350 17 G/17G
17 POWDER, FOR SOLUTION ORAL DAILY
Status: DISCONTINUED | OUTPATIENT
Start: 2020-09-13 | End: 2020-09-13 | Stop reason: HOSPADM

## 2020-09-13 RX ORDER — ACETAMINOPHEN 500 MG
500 TABLET ORAL EVERY 4 HOURS PRN
Status: DISCONTINUED | OUTPATIENT
Start: 2020-09-13 | End: 2020-09-13 | Stop reason: HOSPADM

## 2020-09-13 RX ORDER — OXYCODONE HYDROCHLORIDE 5 MG/1
5 TABLET ORAL ONCE
Status: COMPLETED | OUTPATIENT
Start: 2020-09-13 | End: 2020-09-13

## 2020-09-13 RX ORDER — ONDANSETRON 4 MG/1
4 TABLET, ORALLY DISINTEGRATING ORAL EVERY 8 HOURS PRN
Qty: 10 TABLET | Refills: 0 | Status: SHIPPED | OUTPATIENT
Start: 2020-09-13 | End: 2020-09-16

## 2020-09-13 RX ORDER — SENNOSIDES 8.6 MG
1 TABLET ORAL ONCE
Status: COMPLETED | OUTPATIENT
Start: 2020-09-13 | End: 2020-09-13

## 2020-09-13 RX ORDER — POTASSIUM CHLORIDE 1.5 G/1.58G
40 POWDER, FOR SOLUTION ORAL ONCE
Status: COMPLETED | OUTPATIENT
Start: 2020-09-13 | End: 2020-09-13

## 2020-09-13 RX ORDER — CEPHALEXIN 500 MG/1
500 CAPSULE ORAL 2 TIMES DAILY
Qty: 14 CAPSULE | Refills: 0 | Status: SHIPPED | OUTPATIENT
Start: 2020-09-13 | End: 2020-09-20

## 2020-09-13 RX ORDER — ONDANSETRON 2 MG/ML
4 INJECTION INTRAMUSCULAR; INTRAVENOUS EVERY 30 MIN PRN
Status: DISCONTINUED | OUTPATIENT
Start: 2020-09-13 | End: 2020-09-13 | Stop reason: HOSPADM

## 2020-09-13 RX ADMIN — ONDANSETRON 4 MG: 2 INJECTION INTRAMUSCULAR; INTRAVENOUS at 12:34

## 2020-09-13 RX ADMIN — ACETAMINOPHEN 500 MG: 500 TABLET, FILM COATED ORAL at 14:05

## 2020-09-13 RX ADMIN — CEFTRIAXONE 2 G: 2 INJECTION, POWDER, FOR SOLUTION INTRAMUSCULAR; INTRAVENOUS at 14:11

## 2020-09-13 RX ADMIN — Medication 10 MEQ: at 14:17

## 2020-09-13 RX ADMIN — SODIUM CHLORIDE 1000 ML: 9 INJECTION, SOLUTION INTRAVENOUS at 12:34

## 2020-09-13 RX ADMIN — SODIUM CHLORIDE 1000 ML: 9 INJECTION, SOLUTION INTRAVENOUS at 14:08

## 2020-09-13 RX ADMIN — POTASSIUM CHLORIDE 40 MEQ: 1.5 POWDER, FOR SOLUTION ORAL at 15:30

## 2020-09-13 RX ADMIN — OXYCODONE HYDROCHLORIDE 5 MG: 5 TABLET ORAL at 14:04

## 2020-09-13 RX ADMIN — SENNOSIDES 1 TABLET: 8.6 TABLET, FILM COATED ORAL at 15:30

## 2020-09-13 RX ADMIN — KETOROLAC TROMETHAMINE 15 MG: 15 INJECTION, SOLUTION INTRAMUSCULAR; INTRAVENOUS at 12:36

## 2020-09-13 ASSESSMENT — ENCOUNTER SYMPTOMS
FEVER: 1
FATIGUE: 1
CHILLS: 1
DYSURIA: 1
BACK PAIN: 1
ABDOMINAL PAIN: 1
FREQUENCY: 1

## 2020-09-13 NOTE — ED AVS SNAPSHOT
[facility] Emergency Department  [address]  [facility] [address]  Phone:  [phone]  Fax:  [phone]                                    Blanca Coleman   MRN: [id number]    Department:  [facility] Emergency Department   Date of Visit:  9/13/2020           After Visit Summary Signature Page    I have received my discharge instructions, and my questions have been answered. I have discussed any challenges I see with this plan with the nurse or doctor.    ..........................................................................................................................................  Patient/Patient Representative Signature      ..........................................................................................................................................  Patient Representative Print Name and Relationship to Patient    ..................................................               ................................................  Date                                   Time    ..........................................................................................................................................  Reviewed by Signature/Title    ...................................................              ..............................................  Date                                               Time          22EPIC Rev 08/18

## 2020-09-13 NOTE — ED NOTES
Pump claiming downstream occlusion. Pain to site. Hard flush. IV removed. Potassium replacement nearly finished. Will not start second line unless needed

## 2020-09-13 NOTE — ED PROVIDER NOTES
History     Chief Complaint:  Fever and Urinary Frequency      HPI   Christy Coleman is a 29 year old female who presents with increased urinary frequency and dysuria for 1 week and fever for the last 24 hours.  She reports suprapubic pain and generalized back pain as well.  She has had urinary tract infection once before and reports that this feels similar.  She notes nausea without vomiting.  She denies any chest pain or shortness of breath.  No significant sore throat or diarrhea.  She denies any vaginal discharge or bleeding.    Allergies:  No known drug allergies     Medications:    The patient is not currently taking any prescribed medications.    Past Medical History:    History reviewed.  No pertinent past medical history.    Past Surgical History:    History reviewed. No pertinent surgical history.    Family History:    History reviewed. No pertinent family history.     Review of Systems   Constitutional: Positive for chills, fatigue and fever.   Gastrointestinal: Positive for abdominal pain (suprapubic).   Genitourinary: Positive for dysuria, frequency and urgency.   Musculoskeletal: Positive for back pain.   All other systems reviewed and are negative.    Physical Exam     Patient Vitals for the past 24 hrs:   BP Temp Temp src Pulse Resp SpO2 Weight   09/13/20 1400 114/83 -- -- 79 -- 99 % --   09/13/20 1345 -- -- -- -- -- 98 % --   09/13/20 1330 123/82 -- -- 76 -- 99 % --   09/13/20 1315 -- -- -- -- -- 100 % --   09/13/20 1300 116/76 -- -- 77 -- 100 % --   09/13/20 1245 (!) 134/91 -- -- -- -- 99 % --   09/13/20 1148 (!) 140/109 101.2  F (38.4  C) Oral 105 16 100 % 59 kg (130 lb)       Physical Exam  General: Patient is awake, alert and interactive when I enter the room. Feels warm  Head: The scalp, face, and head appear normal  Eyes: The pupils are equal, round, and reactive to light. Conjunctivae and sclerae are normal  Neck: Normal range of motion. No anterior cervical lymphadenopathy  noted  CV:tachycardiac. No murmurs.   Resp: Lungs are clear without wheezes or rales. No respiratory distress.   GI: Abdomen is soft, no rigidity, guarding, or rebound. No distension. Mild suprapubic pain. Bilateral CVA tenderness.   MS: Normal tone. Joints grossly normal without effusions. No asymmetric leg swelling, calf or thigh tenderness.    Skin: No rash or lesions noted. Normal capillary refill noted  Neuro: Speech is normal and fluent. Face is symmetric. Moving all extremities.   Psych:  Normal affect.  Appropriate interactions.    Emergency Department Course     Laboratory:  Laboratory findings were communicated with the patient who voiced understanding of the findings.    HCG Qualitative Urine: Negative    UA: Urineketon 20, Blood Trace, Protein Albumin 30, Leukocyte Large, WBC/ (H), RBC/HPF 6 (H), Bacteria Few, Squamous 8(H), Mucous Present, o/w Negative    CBC: WBC2.5 (L) ,  (L) o/w WNL. (HGB 12.9)     BMP: Potassium 2.8 (L)    Lactic Acid: 1.2    Interventions:  1234 NS 1L IV Bolus  1234 Zofran 4mg IV injection   1236 Toradol 15 mg, IV      Emergency Department Course:  Past medical records, nursing notes, and vitals reviewed.    1207 I performed an exam of the patient as documented above.      IV was inserted and blood was drawn for laboratory testing, results above.    1330 I rechecked the patient and discussed the results of her workup thus far.     Findings and plan explained to the Patient. Patient discharged home with instructions regarding supportive care, medications, and reasons to return. The importance of close follow-up was reviewed.     I personally reviewed the laboratory results with the Patient and answered all related questions prior to discharge.     Impression & Plan       Medical Decision Making:  Christy Coleman is a 29 year old female who presents with symptoms of fever and dysuria.  Urinalysis confirms the infection and along with her exam this appears to be  pyelonephritis. She is not pregnant. There is no clinical evidence of perinephric abscess, emphysematous pyelonephritis, ureterolithiasis, appendicitis, colitis, diverticulitis or any intraabdominal catastrophe.I will start her on antibiotics for the infection. She received the first IV dose in the department.  On recheck, the patient feels much improved following toradol, Zofran and fluids given in the ED. The patient is able to tolerate PO at this time. I recommended she return immediately for increasing pain, vomiting, fever, inability to tolerate her oral medications, or for any other concerns.  I recommended she follow up with her primary physician in 2-3 days if symptoms have not completely resolved, and that she finish her entire course of antibiotics even if symptom free.       Diagnosis:    ICD-10-CM    1. Pyelonephritis, acute  N10        Disposition:  Discharged to home.    Discharge Medications:  New Prescriptions    CEPHALEXIN (KEFLEX) 500 MG CAPSULE    Take 1 capsule (500 mg) by mouth 2 times daily for 7 days    ONDANSETRON (ZOFRAN ODT) 4 MG ODT TAB    Take 1 tablet (4 mg) by mouth every 8 hours as needed for nausea       Scribe Disclosure:  ESTEBAN, Zhang Martínez, am serving as a scribe at 12:07 PM on 9/13/2020 to document services personally performed by Joss Diaz based on my observations and the provider's statements to me.        Joss Diaz MD  09/13/20 1559

## 2020-09-13 NOTE — ED TRIAGE NOTES
"Patient reports urinary frequency started 1 week ago, fever started 3 days ago, \"I haven't checked it I just feel hot\". Some pelvic pain and flank pain. Feeling fatigued. Some nausea.   "

## 2020-09-18 ENCOUNTER — OFFICE VISIT (OUTPATIENT)
Dept: FAMILY MEDICINE | Facility: CLINIC | Age: 30
End: 2020-09-18
Payer: COMMERCIAL

## 2020-09-18 ENCOUNTER — NURSE TRIAGE (OUTPATIENT)
Dept: NURSING | Facility: CLINIC | Age: 30
End: 2020-09-18

## 2020-09-18 VITALS
BODY MASS INDEX: 25.77 KG/M2 | DIASTOLIC BLOOD PRESSURE: 84 MMHG | OXYGEN SATURATION: 100 % | WEIGHT: 136.4 LBS | TEMPERATURE: 96.1 F | SYSTOLIC BLOOD PRESSURE: 123 MMHG | HEART RATE: 59 BPM

## 2020-09-18 DIAGNOSIS — A59.9 TRICHIMONIASIS: ICD-10-CM

## 2020-09-18 DIAGNOSIS — N10 PYELONEPHRITIS, ACUTE: Primary | ICD-10-CM

## 2020-09-18 LAB
ALBUMIN UR-MCNC: ABNORMAL MG/DL
APPEARANCE UR: CLEAR
BACTERIA #/AREA URNS HPF: ABNORMAL /HPF
BILIRUB UR QL STRIP: ABNORMAL
COLOR UR AUTO: YELLOW
GLUCOSE UR STRIP-MCNC: NEGATIVE MG/DL
HGB UR QL STRIP: NEGATIVE
KETONES UR STRIP-MCNC: >=80 MG/DL
LEUKOCYTE ESTERASE UR QL STRIP: NEGATIVE
MUCOUS THREADS #/AREA URNS LPF: PRESENT /LPF
NITRATE UR QL: NEGATIVE
NON-SQ EPI CELLS #/AREA URNS LPF: ABNORMAL /LPF
PH UR STRIP: 8 PH (ref 5–7)
RBC #/AREA URNS AUTO: ABNORMAL /HPF
SOURCE: ABNORMAL
SP GR UR STRIP: 1.02 (ref 1–1.03)
TRICHOMONAS #/AREA URNS HPF: PRESENT /HPF
UROBILINOGEN UR STRIP-ACNC: 1 EU/DL (ref 0.2–1)
WBC #/AREA URNS AUTO: ABNORMAL /HPF

## 2020-09-18 PROCEDURE — 81001 URINALYSIS AUTO W/SCOPE: CPT | Performed by: FAMILY MEDICINE

## 2020-09-18 PROCEDURE — 99214 OFFICE O/P EST MOD 30 MIN: CPT | Performed by: FAMILY MEDICINE

## 2020-09-18 PROCEDURE — 87591 N.GONORRHOEAE DNA AMP PROB: CPT | Performed by: FAMILY MEDICINE

## 2020-09-18 PROCEDURE — 87086 URINE CULTURE/COLONY COUNT: CPT | Performed by: FAMILY MEDICINE

## 2020-09-18 PROCEDURE — 87491 CHLMYD TRACH DNA AMP PROBE: CPT | Performed by: FAMILY MEDICINE

## 2020-09-18 RX ORDER — CIPROFLOXACIN 500 MG/1
500 TABLET, FILM COATED ORAL 2 TIMES DAILY
Qty: 14 TABLET | Refills: 0 | Status: SHIPPED | OUTPATIENT
Start: 2020-09-18 | End: 2020-09-25

## 2020-09-18 RX ORDER — METRONIDAZOLE 500 MG/1
500 TABLET ORAL 2 TIMES DAILY
Qty: 14 TABLET | Refills: 0 | Status: SHIPPED | OUTPATIENT
Start: 2020-09-18 | End: 2021-01-19

## 2020-09-18 NOTE — PROGRESS NOTES
Subjective     Blanca Coleman is a 29 year old female who presents to clinic today for the following health issues:    HPI     ED/UC Followup:    Facility:  [facility]  Date of visit: 9/13/20  Reason for visit: Fever, Urinary fequency  Current Status: patient has no appetite, patients body feels weak, pelvic pain and spotting     increased urinary frequency and dysuria for 1 week and was seen in the ER on 9/13.  She reports suprapubic pain and generalized back pain as well    Started cephalexin then  Since then:  Urine a little better but not normal  + vaginal discharge      Review of Systems   Constitutional, HEENT, cardiovascular, pulmonary, GI, , musculoskeletal, neuro, skin, endocrine and psych systems are negative, except as otherwise noted.      Objective    /84   Pulse 59   Temp 96.1  F (35.6  C) (Tympanic)   Wt 61.9 kg (136 lb 6.4 oz)   LMP 09/01/2020 (Exact Date)   SpO2 100%   BMI 25.77 kg/m    Body mass index is 25.77 kg/m .     Physical Exam   GENERAL: healthy, alert and no distress  NECK: no adenopathy, no asymmetry, masses, or scars and thyroid normal to palpation  RESP: lungs clear to auscultation - no rales, rhonchi or wheezes  CV: regular rate and rhythm, normal S1 S2, no S3 or S4, no murmur, click or rub, no peripheral edema and peripheral pulses strong  ABDOMEN: soft, nontender, no hepatosplenomegaly, no masses and bowel sounds normal  MS: no gross musculoskeletal defects noted, no edema    Results for orders placed or performed in visit on 09/18/20 (from the past 24 hour(s))   UA reflex to Microscopic   Result Value Ref Range    Color Urine Yellow     Appearance Urine Clear     Glucose Urine Negative NEG^Negative mg/dL    Bilirubin Urine Small (A) NEG^Negative    Ketones Urine >=80 (A) NEG^Negative mg/dL    Specific Gravity Urine 1.020 1.003 - 1.035    Blood Urine Negative NEG^Negative    pH Urine 8.0 (H) 5.0 - 7.0 pH    Protein Albumin Urine Trace (A) NEG^Negative mg/dL     Urobilinogen Urine 1.0 0.2 - 1.0 EU/dL    Nitrite Urine Negative NEG^Negative    Leukocyte Esterase Urine Negative NEG^Negative    Source Midstream Urine    Urine Microscopic   Result Value Ref Range    WBC Urine 5-10 (A) OTO5^0 - 5 /HPF    RBC Urine 2-5 (A) OTO2^O - 2 /HPF    Squamous Epithelial /LPF Urine Moderate (A) FEW^Few /LPF    Bacteria Urine Few (A) NEG^Negative /HPF    Mucous Urine Present (A) NEG^Negative /LPF    Trichomonas Present (A) NEG^Negative /HPF           Assessment & Plan     Pyelonephritis, acute    - UA reflex to Microscopic  - Urine Culture Aerobic Bacterial  - Chlamydia trachomatis PCR  - Neisseria gonorrhoeae PCR    - Urine Microscopic    Trichimoniasis  Initially sent cipro, but found trich on micro    Add for trich, await culture    - metroNIDAZOLE (FLAGYL) 500 MG tablet; Take 1 tablet (500 mg) by mouth 2 times daily       Scooter Hodgson MD  [facility]

## 2020-09-18 NOTE — TELEPHONE ENCOUNTER
She was seen in the ER on Sunday for an UTI.  She was given an antibiotic but doesn't seem to be getting any better. She is still having bleeding when she wipes and in her underware. It is about the size of a quarter and brownish red. Her pain is minimal, and she reports no urgency or back pain. She feels very weak and she can't eat and she is very hungry . She is able to eat some grapes ,yogurt and broth,but feels she needs more. She doesn't know why she can't eat, she just can't. I suggested something with protein might give her the energy she needs. Otherwise good old fashioned rest and fluids .  Transferred to scheduling to see if she can get in to the clinic today to be seen.    Yen Carpenter RN/ [facility]        Reason for Disposition    All other urine symptoms    Additional Information    Negative: Shock suspected (e.g., cold/pale/clammy skin, too weak to stand, low BP, rapid pulse)    Negative: Sounds like a life-threatening emergency to the triager    Negative: [1] Unable to urinate (or only a few drops) > 4 hours AND     [2] bladder feels very full (e.g., palpable bladder or strong urge to urinate)    Negative: [1] Decreased urination and [2] drinking very little AND [2] dehydration suspected (e.g., dark urine, no urine > 12 hours, very dry mouth, very lightheaded)    Negative: Patient sounds very sick or weak to the triager    Negative: Side (flank) or lower back pain present    Negative: [1] Can't control passage of urine (i.e., urinary incontinence) AND [2] new onset (< 2 weeks) or worsening    Negative: Urinating more frequently than usual (i.e., frequency)    Negative: Bad or foul-smelling urine    Negative: Fever > 100.5 F (38.1 C)    Negative: Has to get out of bed to urinate > 2 times a night (i.e., nocturia)    Negative: Dribbling (losing urine) just after finishing urination (i.e., post-void dribbling)    Negative: Urination is difficult to start (i.e., hesitancy) or  straining    Negative: [1] Can't control passage of urine (i.e., urinary incontinence, wetting self) AND [2] present > 2 weeks    Protocols used: URINARY SYMPTOMS-A-AH

## 2020-09-19 LAB
BACTERIA SPEC CULT: NO GROWTH
BACTERIA SPEC CULT: NO GROWTH
SPECIMEN SOURCE: NORMAL
SPECIMEN SOURCE: NORMAL

## 2020-09-21 LAB
BACTERIA SPEC CULT: NORMAL
SPECIMEN SOURCE: NORMAL

## 2020-09-22 LAB
N GONORRHOEA DNA SPEC QL NAA+PROBE: NEGATIVE
SPECIMEN SOURCE: NORMAL

## 2020-09-23 ENCOUNTER — MYC MEDICAL ADVICE (OUTPATIENT)
Dept: FAMILY MEDICINE | Facility: CLINIC | Age: 30
End: 2020-09-23

## 2020-09-23 ENCOUNTER — TELEPHONE (OUTPATIENT)
Dept: FAMILY MEDICINE | Facility: CLINIC | Age: 30
End: 2020-09-23

## 2020-09-23 DIAGNOSIS — A74.9 CHLAMYDIA INFECTION: Primary | ICD-10-CM

## 2020-09-23 LAB
C TRACH DNA SPEC QL NAA+PROBE: POSITIVE
SPECIMEN SOURCE: ABNORMAL

## 2020-09-23 RX ORDER — AZITHROMYCIN 500 MG/1
1000 TABLET, FILM COATED ORAL DAILY
Qty: 2 TABLET | Refills: 0 | Status: SHIPPED | OUTPATIENT
Start: 2020-09-23 | End: 2020-09-24

## 2020-09-23 NOTE — TELEPHONE ENCOUNTER
Please call her and let her know that she tested positive    Will send in treatment to clear this  Avid sex for 1 week  Encouraged her to tell all sexual contacts to get tested and treated     If she has any concerns we can test for other STDs like HIV and syphilis.  Just let us know    SN

## 2020-09-23 NOTE — TELEPHONE ENCOUNTER
SN,   Please advise in JF's absence  Pt positive for chlamydia  NKDA  Pended Azithromycin Rx  Started [facility] form at [facility]  Please advise  Thanks,  Laura SCHROEDER RN

## 2020-11-08 ENCOUNTER — HEALTH MAINTENANCE LETTER (OUTPATIENT)
Age: 30
End: 2020-11-08

## 2020-12-31 ENCOUNTER — TELEPHONE (OUTPATIENT)
Dept: FAMILY MEDICINE | Facility: CLINIC | Age: 30
End: 2020-12-31

## 2020-12-31 NOTE — TELEPHONE ENCOUNTER
Patient Quality Outreach      Summary:      Patient is due/failing the following:   Cervical Cancer Screening - PAP Needed    Type of outreach:    Phone, left message for patient/parent to call back.    Questions for provider review:    None                                                                                                                                     Shannen Richardson     Chart routed to Care Team                                  .

## 2020-12-31 NOTE — LETTER
[facility]  [address]  [facility] [id number] [phone]       February 24, 2021    Blanca Coleman  [address] [facility] [address]    Dear Blanca,    We care about your health and have reviewed your health plan and are making recommendations based on this review, to optimize your health.    You are in particular need of attention regarding:  -Wellness (Physical) Visit     We are recommending that you:  -schedule a PAP SMEAR EXAM which is due.  Please disregard this reminder if you have had this exam elsewhere within the last year.  It would be helpful for us to have a copy of your recent pap smear report in our file so that we can best coordinate your care.    If you are under/uninsured, we recommend you contact the [facility]. They offer pap smears at no charge or on a sliding fee charge. You can schedule with them at [phone]. Please have them send us the results.      In addition, here is a list of due or overdue Health Maintenance reminders.    Health Maintenance Due   Topic Date Due     Preventive Care Visit  1990     Discuss Advance Care Planning  1990     HIV Screening  12/08/2005     Hepatitis C Screening  12/08/2008     PAP Smear  02/17/2019     Flu Vaccine (1) 09/01/2020     PHQ-2  01/01/2021       To address the above recommendations, we encourage you to contact us at [phone], via [employer] or by contacting Central Scheduling toll free at [phone] 24 hours a day. They will assist you with finding the most convenient time and location.    Thank you for trusting [facility] and we appreciate the opportunity to serve you.  We look forward to supporting your healthcare needs in the future.    Healthy Regards,    Your [facility] Team

## 2021-02-09 DIAGNOSIS — F12.10 CANNABIS ABUSE, CONTINUOUS: Primary | ICD-10-CM

## 2021-02-09 DIAGNOSIS — F11.20 OPIOID TYPE DEPENDENCE, CONTINUOUS (H): ICD-10-CM

## 2021-02-09 PROCEDURE — 80349 CANNABINOIDS NATURAL: CPT | Performed by: PHYSICIAN ASSISTANT

## 2021-02-10 LAB — CREAT UR-MCNC: 99 MG/DL

## 2021-02-12 LAB
CANNABINOIDS UR CFM-MCNC: 175 NG/ML
CARBOXYTHC/CREAT UR: 177 NG/MG{CREAT}

## 2021-02-24 NOTE — TELEPHONE ENCOUNTER
Patient Quality Outreach 2nd Attempt      Summary:    Type of outreach:    Sent letter.    Next Steps:  Reach out within 90 days via Letter.    Max number of attempts reached: No. Will try again in 90 days if patient still on fail list.    Questions for provider review:    None                                                                                                                    Jeni Oleary MA  Medical Assistant  [facility]

## 2021-03-01 ENCOUNTER — OFFICE VISIT (OUTPATIENT)
Dept: INTERNAL MEDICINE | Facility: CLINIC | Age: 31
End: 2021-03-01
Payer: COMMERCIAL

## 2021-03-01 VITALS
BODY MASS INDEX: 27.45 KG/M2 | DIASTOLIC BLOOD PRESSURE: 84 MMHG | RESPIRATION RATE: 16 BRPM | HEIGHT: 61 IN | WEIGHT: 145.4 LBS | OXYGEN SATURATION: 100 % | SYSTOLIC BLOOD PRESSURE: 128 MMHG | HEART RATE: 69 BPM | TEMPERATURE: 99 F

## 2021-03-01 DIAGNOSIS — Z11.59 ENCOUNTER FOR HEPATITIS C VIRUS SCREENING TEST FOR HIGH RISK PATIENT: ICD-10-CM

## 2021-03-01 DIAGNOSIS — N89.8 VAGINAL DISCHARGE: ICD-10-CM

## 2021-03-01 DIAGNOSIS — R53.83 FATIGUE, UNSPECIFIED TYPE: ICD-10-CM

## 2021-03-01 DIAGNOSIS — B37.31 YEAST INFECTION OF THE VAGINA: ICD-10-CM

## 2021-03-01 DIAGNOSIS — N76.0 BACTERIAL VAGINOSIS: Primary | ICD-10-CM

## 2021-03-01 DIAGNOSIS — N63.0 LUMP OR MASS IN BREAST: ICD-10-CM

## 2021-03-01 DIAGNOSIS — R63.5 WEIGHT GAIN: ICD-10-CM

## 2021-03-01 DIAGNOSIS — Z11.4 ENCOUNTER FOR SCREENING FOR HIV: ICD-10-CM

## 2021-03-01 DIAGNOSIS — Z13.6 ENCOUNTER FOR SCREENING FOR CARDIOVASCULAR DISORDERS: ICD-10-CM

## 2021-03-01 DIAGNOSIS — Z00.00 ROUTINE HISTORY AND PHYSICAL EXAMINATION OF ADULT: ICD-10-CM

## 2021-03-01 DIAGNOSIS — Z12.4 CERVICAL CANCER SCREENING: Primary | ICD-10-CM

## 2021-03-01 DIAGNOSIS — B96.89 BACTERIAL VAGINOSIS: Primary | ICD-10-CM

## 2021-03-01 DIAGNOSIS — Z91.89 ENCOUNTER FOR HEPATITIS C VIRUS SCREENING TEST FOR HIGH RISK PATIENT: ICD-10-CM

## 2021-03-01 DIAGNOSIS — Z11.3 SCREEN FOR STD (SEXUALLY TRANSMITTED DISEASE): ICD-10-CM

## 2021-03-01 LAB
SPECIMEN SOURCE: ABNORMAL
WET PREP SPEC: ABNORMAL

## 2021-03-01 PROCEDURE — 87624 HPV HI-RISK TYP POOLED RSLT: CPT | Performed by: NURSE PRACTITIONER

## 2021-03-01 PROCEDURE — 87210 SMEAR WET MOUNT SALINE/INK: CPT | Performed by: NURSE PRACTITIONER

## 2021-03-01 PROCEDURE — G0124 SCREEN C/V THIN LAYER BY MD: HCPCS | Performed by: PATHOLOGY

## 2021-03-01 PROCEDURE — 99214 OFFICE O/P EST MOD 30 MIN: CPT | Mod: 25 | Performed by: NURSE PRACTITIONER

## 2021-03-01 PROCEDURE — G0145 SCR C/V CYTO,THINLAYER,RESCR: HCPCS | Performed by: NURSE PRACTITIONER

## 2021-03-01 PROCEDURE — 87491 CHLMYD TRACH DNA AMP PROBE: CPT | Performed by: NURSE PRACTITIONER

## 2021-03-01 PROCEDURE — 87591 N.GONORRHOEAE DNA AMP PROB: CPT | Performed by: NURSE PRACTITIONER

## 2021-03-01 PROCEDURE — 99395 PREV VISIT EST AGE 18-39: CPT | Performed by: NURSE PRACTITIONER

## 2021-03-01 RX ORDER — FLUCONAZOLE 150 MG/1
150 TABLET ORAL ONCE
Qty: 2 TABLET | Refills: 0 | Status: SHIPPED | OUTPATIENT
Start: 2021-03-01 | End: 2021-03-01

## 2021-03-01 RX ORDER — METRONIDAZOLE 500 MG/1
500 TABLET ORAL 2 TIMES DAILY
Qty: 14 TABLET | Refills: 0 | Status: SHIPPED | OUTPATIENT
Start: 2021-03-01 | End: 2021-03-08

## 2021-03-01 ASSESSMENT — ENCOUNTER SYMPTOMS
EYE PAIN: 0
DIARRHEA: 0
NERVOUS/ANXIOUS: 0
NAUSEA: 0
PALPITATIONS: 0
ABDOMINAL PAIN: 0
SORE THROAT: 0
MYALGIAS: 1
COUGH: 0
ARTHRALGIAS: 1
DYSURIA: 0
JOINT SWELLING: 0
WEAKNESS: 1
FREQUENCY: 0
PARESTHESIAS: 0
HEADACHES: 0
FEVER: 0
DIZZINESS: 0
CONSTIPATION: 1
HEARTBURN: 0
HEMATOCHEZIA: 0
CHILLS: 0
HEMATURIA: 0
SHORTNESS OF BREATH: 0

## 2021-03-01 ASSESSMENT — MIFFLIN-ST. JEOR: SCORE: 1316.91

## 2021-03-01 NOTE — PROGRESS NOTES
SUBJECTIVE:   CC: Blanca Coleman is an 30 year old woman who presents for preventive health visit.       Patient has been advised of split billing requirements and indicates understanding: Yes  Healthy Habits:     Getting at least 3 servings of Calcium per day:  Yes    Bi-annual eye exam:  NO    Dental care twice a year:  NO    Sleep apnea or symptoms of sleep apnea:  None    Diet:  Regular (no restrictions)    Frequency of exercise:  None    Taking medications regularly:  Yes    Medication side effects:  None    PHQ-2 Total Score: 2    Additional concerns today:  No        Today's PHQ-2 Score:   PHQ-2 ( 1999 Pfizer) 3/1/2021   Q1: Little interest or pleasure in doing things 2   Q2: Feeling down, depressed or hopeless 0   PHQ-2 Score 2   Q1: Little interest or pleasure in doing things More than half the days   Q2: Feeling down, depressed or hopeless Not at all   PHQ-2 Score 2       Abuse: Current or Past (Physical, Sexual or Emotional) - No  Do you feel safe in your environment? Yes    Have you ever done Advance Care Planning? (For example, a Health Directive, POLST, or a discussion with a medical provider or your loved ones about your wishes): No, advance care planning information given to patient to review.  Patient declined advance care planning discussion at this time.     Here for physical exam to include pap.  Reviewed last labs by another provider: BMP (K 2.8 L, glucose 124 H, and Calcium 8.0 L) and  CBC with WBC 2.5 L and  L.  Last GC/CH 9/18/2020 with negative GC and positive Chlamydia.  Overall reports vaginal discharge and sexually active.  Has had 5 children. Also reports ongoing fatigue and some weight gain.      No fever or cough.  No shortness of breathe or chest pain.  No stomach or urination issues.  Mild joint pain.    Social History     Tobacco Use     Smoking status: Never Smoker     Smokeless tobacco: Never Used   Substance Use Topics     Alcohol use: Yes     Comment: rarely     If  you drink alcohol do you typically have >3 drinks per day or >7 drinks per week? No    Alcohol Use 3/1/2021   Prescreen: >3 drinks/day or >7 drinks/week? No   Prescreen: >3 drinks/day or >7 drinks/week? -       Any new diagnosis of family breast, ovarian, or bowel cancer? No     Reviewed orders with patient.  Reviewed health maintenance and updated orders accordingly - Yes  Labs reviewed in EPIC  There is no problem list on file for this patient.    Past Surgical History:   Procedure Laterality Date     ENT SURGERY      jaw surgery       Social History     Tobacco Use     Smoking status: Never Smoker     Smokeless tobacco: Never Used   Substance Use Topics     Alcohol use: Yes     Comment: rarely     Family History   Problem Relation Age of Onset     Hypertension Mother          No current outpatient medications on file.     No Known Allergies    Breast CA Risk Screening:  No flowsheet data found.    Patient under 40 years of age: Routine Mammogram Screening not recommended.   Pertinent mammograms are reviewed under the imaging tab.    History of abnormal Pap smear: NO - age 30- 65 PAP every 3 years recommended     Reviewed and updated as needed this visit by clinical staff  Tobacco  Allergies  Meds   Med Hx  Surg Hx  Fam Hx  Soc Hx        Reviewed and updated as needed this visit by Provider  Tobacco  Allergies  Meds   Med Hx  Surg Hx  Fam Hx         History reviewed. No pertinent past medical history.   Past Surgical History:   Procedure Laterality Date     ENT SURGERY      jaw surgery       Review of Systems   Constitutional: Negative for chills and fever.   HENT: Positive for hearing loss. Negative for congestion, ear pain and sore throat.    Eyes: Negative for pain and visual disturbance.   Respiratory: Negative for cough and shortness of breath.    Cardiovascular: Negative for chest pain, palpitations and peripheral edema.   Gastrointestinal: Positive for constipation. Negative for abdominal pain,  "diarrhea, heartburn, hematochezia and nausea.   Genitourinary: Negative for dysuria, frequency, genital sores, hematuria and urgency.   Musculoskeletal: Positive for arthralgias and myalgias. Negative for joint swelling.   Skin: Negative for rash.   Neurological: Positive for weakness. Negative for dizziness, headaches and paresthesias.   Psychiatric/Behavioral: Positive for mood changes. The patient is not nervous/anxious.         OBJECTIVE:   /84 (BP Location: Right arm, Patient Position: Sitting, Cuff Size: Adult Regular)   Pulse 69   Temp 99  F (37.2  C) (Oral)   Resp 16   Ht 1.549 m (5' 1\")   Wt 66 kg (145 lb 6.4 oz)   LMP 02/14/2021 (Exact Date)   SpO2 100%   BMI 27.47 kg/m       Physical Exam  GENERAL: alert and no distress  EYES: Eyes grossly normal to inspection, PERRLA  HENT: ear canals and TM's normal,   NECK: no adenopathy, no asymmetry, masses, or scars and thyroid normal to palpation  RESP: lungs clear to auscultation - no rales, rhonchi or wheezes  BREAST: right normal without masses, tenderness or nipple discharge and no palpable axillary masses or adenopathy; left with about 1 cm movable nodule just above nipple at 12 oclock  CV: regular rate and rhythm, normal S1 S2, no S3 or S4, no murmur, click or rub, no peripheral edema and peripheral pulses strong  ABDOMEN: soft, nontender, no hepatosplenomegaly, no masses and bowel sounds normal   (female): normal female external genitalia, normal urethral meatus, vaginal mucosa pink, moist, well rugated, and normal cervix/adnexa/uterus without masses; + discharge; pap, GC/CH, and Wet mount completed and pending  MS: no gross musculoskeletal defects noted, no edema  SKIN: no suspicious lesions or rashes  NEURO: Normal strength and tone, mentation intact and speech normal  PSYCH: mentation appears normal, affect normal/bright    Diagnostic Test Results:  Labs reviewed in Epic    ASSESSMENT/PLAN:   1. Routine history and physical examination of " adult  - 30 year old female  - **Comprehensive metabolic panel FUTURE anytime; Future  - CBC with platelets differential; Future    2. Cervical cancer screening  - Pap imaged thin layer screen with HPV - recommended age 30 - 65 years (select HPV order below)    3. Vaginal discharge  - with recent history (9/2020 testing positive for Chlamydia)  - Wet prep  - NEISSERIA GONORRHOEA PCR  - CHLAMYDIA TRACHOMATIS PCR    4. Screen for STD (sexually transmitted disease)  - Wet prep  - NEISSERIA GONORRHOEA PCR  - CHLAMYDIA TRACHOMATIS PCR    5. Encounter for hepatitis C virus screening test for high risk patient  - **Hepatitis C Screen Reflex to RNA FUTURE anytime; Future    6. Encounter for screening for HIV  - **HIV Antigen Antibody Combo FUTURE anytime; Future    7. Encounter for screening for cardiovascular disorders  - Lipid panel reflex to direct LDL Fasting; Future    8. Weight gain  - lost weight than gained back 5-10 pounds  - **TSH with free T4 reflex FUTURE anytime; Future    9. Fatigue, unspecified type  - **Comprehensive metabolic panel FUTURE anytime; Future  - CBC with platelets differential; Future  - **TSH with free T4 reflex FUTURE anytime; Future    10. Lump or mass in breast  - MA Diagnostic Digital Bilateral; Future    Patient instructions:  See above.  Physical exam with pap specimen collected along with Gonorrhea and Chlamydia + wet mount (all pending)    Stop by Lab and schedule for fasting labs at your convenience    Ordered US of left breast with diagnostic mammogram: they will be calling you.      Patient has been advised of split billing requirements and indicates understanding: Yes  COUNSELING:  Reviewed preventive health counseling, as reflected in patient instructions       Regular exercise       Healthy diet/nutrition       Immunizations    Declined: Influenza due to Conscientious objector               Safe sex practices/STD prevention       Consider Hep C screening for all patients one time  "for ages 18-79 years       HIV screeninx in teen years, 1x in adult years, and at intervals if high risk    Estimated body mass index is 27.47 kg/m  as calculated from the following:    Height as of this encounter: 1.549 m (5' 1\").    Weight as of this encounter: 66 kg (145 lb 6.4 oz).    Weight management plan: Discussed healthy diet and exercise guidelines    She reports that she has never smoked. She has never used smokeless tobacco.      Counseling Resources:  ATP IV Guidelines  Pooled Cohorts Equation Calculator  Breast Cancer Risk Calculator  BRCA-Related Cancer Risk Assessment: FHS-7 Tool  FRAX Risk Assessment  ICSI Preventive Guidelines  Dietary Guidelines for Americans, 2010  USDA's MyPlate  ASA Prophylaxis  Lung CA Screening    Kim Waters CNP  [facility]  "

## 2021-03-01 NOTE — PATIENT INSTRUCTIONS
Physical exam with pap specimen collected along with Gonorrhea and Chlamydia + wet mount (all pending)    Stop by Lab and schedule for fasting labs at your convenience    Ordered US of left breast with diagnostic mammogram: they will be calling you.

## 2021-03-01 NOTE — NURSING NOTE
"Physical with a pap.  Vital signs:  Temp: 99  F (37.2  C) Temp src: Oral BP: 128/84 Pulse: 69   Resp: 16 SpO2: 100 %     Height: 154.9 cm (5' 1\") Weight: 66 kg (145 lb 6.4 oz)  Estimated body mass index is 27.47 kg/m  as calculated from the following:    Height as of this encounter: 1.549 m (5' 1\").    Weight as of this encounter: 66 kg (145 lb 6.4 oz).          "

## 2021-03-03 LAB
C TRACH DNA SPEC QL NAA+PROBE: NEGATIVE
N GONORRHOEA DNA SPEC QL NAA+PROBE: NEGATIVE
SPECIMEN SOURCE: NORMAL
SPECIMEN SOURCE: NORMAL

## 2021-03-05 LAB
COPATH REPORT: ABNORMAL
PAP: ABNORMAL

## 2021-03-08 LAB
FINAL DIAGNOSIS: NORMAL
HPV HR 12 DNA CVX QL NAA+PROBE: NEGATIVE
HPV16 DNA SPEC QL NAA+PROBE: NEGATIVE
HPV18 DNA SPEC QL NAA+PROBE: NEGATIVE
SPECIMEN DESCRIPTION: NORMAL
SPECIMEN SOURCE CVX/VAG CYTO: NORMAL

## 2021-03-09 ENCOUNTER — PATIENT OUTREACH (OUTPATIENT)
Dept: INTERNAL MEDICINE | Facility: CLINIC | Age: 31
End: 2021-03-09

## 2021-03-09 NOTE — TELEPHONE ENCOUNTER
4/17/12 ASCUS pap, neg HR HPV @ age 21 (in Care Everywhere)   2/17/16 ASCUS pap, + HR HPV (type not specified) @ age 25 (in Care Everywhere)   4/4/17 ASCUS pap, neg HR HPV (in Care Everywhere)   3/1/21 ASCUS pap, neg HR HPV. Plan 1 year cotest

## 2021-03-10 ENCOUNTER — HOSPITAL ENCOUNTER (OUTPATIENT)
Dept: MAMMOGRAPHY | Facility: CLINIC | Age: 31
End: 2021-03-10
Attending: NURSE PRACTITIONER
Payer: COMMERCIAL

## 2021-03-10 ENCOUNTER — HOSPITAL ENCOUNTER (OUTPATIENT)
Dept: ULTRASOUND IMAGING | Facility: CLINIC | Age: 31
End: 2021-03-10
Attending: NURSE PRACTITIONER
Payer: COMMERCIAL

## 2021-03-10 DIAGNOSIS — N63.0 LUMP OR MASS IN BREAST: ICD-10-CM

## 2021-03-10 PROCEDURE — 76642 ULTRASOUND BREAST LIMITED: CPT | Mod: LT

## 2021-03-10 PROCEDURE — G0279 TOMOSYNTHESIS, MAMMO: HCPCS

## 2021-06-01 VITALS — WEIGHT: 135 LBS | HEIGHT: 61 IN | BODY MASS INDEX: 25.51 KG/M2 | BODY MASS INDEX: 25.51 KG/M2

## 2021-07-13 ENCOUNTER — TELEPHONE (OUTPATIENT)
Dept: OBGYN | Facility: CLINIC | Age: 31
End: 2021-07-13

## 2021-07-13 NOTE — TELEPHONE ENCOUNTER
No appts until next week.    Pt will go to urgent care for poss bacteria inf and blood type and screen.  Also may need hcg quant.    Had heavier bleeding last week, no current bleeding.    Sadie RUBIO R.N.

## 2021-07-13 NOTE — TELEPHONE ENCOUNTER
Patient was referred to BV OB because she had a recent Miscarriage. She needs to be scheduled, but not sure if a Midwife can see her. Please call patient back today to advise.

## 2021-09-12 ENCOUNTER — HEALTH MAINTENANCE LETTER (OUTPATIENT)
Age: 31
End: 2021-09-12

## 2021-12-28 DIAGNOSIS — B37.31 YEAST INFECTION OF THE VAGINA: ICD-10-CM

## 2021-12-30 DIAGNOSIS — B37.31 YEAST INFECTION OF THE VAGINA: ICD-10-CM

## 2021-12-31 DIAGNOSIS — B37.31 YEAST INFECTION OF THE VAGINA: ICD-10-CM

## 2021-12-31 RX ORDER — FLUCONAZOLE 150 MG/1
TABLET ORAL
Qty: 2 TABLET | Refills: 0 | OUTPATIENT
Start: 2021-12-31

## 2021-12-31 NOTE — TELEPHONE ENCOUNTER
Called patient as there are multiple requests for Fluconazole.  Patient stated she doesn't need this medication and has not been requesting it.  Called pharmacy and they stated this is not on automated request, so someone has been requesting it.    Medication refused, not needed.     .

## 2021-12-31 NOTE — TELEPHONE ENCOUNTER
Medication refused, multiple requests.     Pending Prescriptions:                       Disp   Refills    fluconazole (DIFLUCAN) 150 MG tablet [Pha*2 tabl*0            Sig: TAKE 1 TABLET(150 MG) BY MOUTH 1 TIME FOR 1 DOSE      Myc message sent to patient yesterday.

## 2021-12-31 NOTE — TELEPHONE ENCOUNTER
Medication refused, not needed. .   Refused Prescriptions:                       Disp   Refills    fluconazole (DIFLUCAN) 150 MG tablet [Phar*2 tabl*0        Sig: TAKE 1 TABLET(150 MG) BY MOUTH 1 TIME FOR 1 DOSE

## 2022-02-11 ENCOUNTER — PATIENT OUTREACH (OUTPATIENT)
Dept: INTERNAL MEDICINE | Facility: CLINIC | Age: 32
End: 2022-02-11
Payer: COMMERCIAL

## 2022-02-11 DIAGNOSIS — R87.610 ASCUS OF CERVIX WITH NEGATIVE HIGH RISK HPV: ICD-10-CM

## 2022-03-16 ENCOUNTER — TELEPHONE (OUTPATIENT)
Dept: DERMATOLOGY | Facility: CLINIC | Age: 32
End: 2022-03-16
Payer: COMMERCIAL

## 2022-03-16 NOTE — TELEPHONE ENCOUNTER
[facility] Call Center    Phone Message    May a detailed message be left on voicemail: yes     Reason for Call: Appointment Intake    Referring Provider Name: NA  Diagnosis and/or Symptoms: Chemical Peel  Pt would like to schedule an Appt and there is no available Appt's.   Please call Pt to discuss. Thanks    Action Taken: Message routed to:  [facility] Derm    Travel Screening: Not Applicable

## 2022-03-18 NOTE — TELEPHONE ENCOUNTER
At [facility] yes    Ivonne Fleming MD    Department of Dermatology  [facility]: Phone: [phone], Fax:[phone]  [facility]: Phone: [phone], Fax: [phone]

## 2022-03-18 NOTE — TELEPHONE ENCOUNTER
I spoke with Blanca and notified her that she would need to establish care with a dermatologist in order to start chemical peels.  I notified her that we are offering peels at our [place] location.  She declined an appointment as she lives in [place].    Meche Machado RN

## 2022-04-01 NOTE — TELEPHONE ENCOUNTER
FYI to provider - Patient is lost to pap tracking follow-up. Attempts to contact pt have been made per reminder process and there has been no reply and/or no appt scheduled. Contact hx listed below.     4/17/12 ASCUS pap, neg HR HPV @ age 21 (in Care Everywhere)   2/17/16 ASCUS pap, + HR HPV (type not specified) @ age 25 (in Care Everywhere)   4/4/17 ASCUS pap, neg HR HPV (in Care Everywhere)   3/1/21 ASCUS pap, neg HR HPV. Plan 1 year cotest  2/11/22 Reminder mychart  3/4/22 Reminder call-spoke with pt  4/1/22 Lost to follow up

## 2022-04-12 ENCOUNTER — OFFICE VISIT (OUTPATIENT)
Dept: FAMILY MEDICINE | Facility: CLINIC | Age: 32
End: 2022-04-12
Payer: COMMERCIAL

## 2022-04-12 VITALS
HEART RATE: 79 BPM | WEIGHT: 142 LBS | OXYGEN SATURATION: 100 % | HEIGHT: 61 IN | BODY MASS INDEX: 26.81 KG/M2 | TEMPERATURE: 98.1 F | SYSTOLIC BLOOD PRESSURE: 107 MMHG | DIASTOLIC BLOOD PRESSURE: 75 MMHG

## 2022-04-12 DIAGNOSIS — N76.0 VAGINITIS AND VULVOVAGINITIS: Primary | ICD-10-CM

## 2022-04-12 DIAGNOSIS — Z11.3 ROUTINE SCREENING FOR STI (SEXUALLY TRANSMITTED INFECTION): ICD-10-CM

## 2022-04-12 LAB
CLUE CELLS: ABNORMAL
TRICHOMONAS, WET PREP: ABNORMAL
WBC'S/HIGH POWER FIELD, WET PREP: ABNORMAL
YEAST, WET PREP: ABNORMAL

## 2022-04-12 PROCEDURE — 99000 SPECIMEN HANDLING OFFICE-LAB: CPT | Performed by: PATHOLOGY

## 2022-04-12 PROCEDURE — 87210 SMEAR WET MOUNT SALINE/INK: CPT | Performed by: PATHOLOGY

## 2022-04-12 PROCEDURE — 99203 OFFICE O/P NEW LOW 30 MIN: CPT | Performed by: NURSE PRACTITIONER

## 2022-04-12 PROCEDURE — 87591 N.GONORRHOEAE DNA AMP PROB: CPT | Mod: 90 | Performed by: PATHOLOGY

## 2022-04-12 PROCEDURE — 87491 CHLMYD TRACH DNA AMP PROBE: CPT | Mod: 90 | Performed by: PATHOLOGY

## 2022-04-12 RX ORDER — BICTEGRAVIR SODIUM, EMTRICITABINE, AND TENOFOVIR ALAFENAMIDE FUMARATE 50; 200; 25 MG/1; MG/1; MG/1
1 TABLET ORAL DAILY
COMMUNITY
Start: 2022-01-28

## 2022-04-12 NOTE — PATIENT INSTRUCTIONS
"For additional information on BV, yeast infections, boric acid use, and prevention please see the link below:    Bacterial vaginosis - CDC Fact Sheet - [facility]  https://www.[facility]renthood.org/uploads/filer_public/9f/de/6spp161m-5m41-1lk3-4v98-42t5256w8462/combinepdf_5.pdf    For probiotics, I recommend \"Culturelle\" or \"Align\" brands.    For soaps, I recommend \"Aveeno Body Wash with Prebiotic Oat + [place] Oil\"     For general vaginal health, I also recommend using condoms during intercourse, wearing cotton underwear, avoid wearing underwear at night while sleeping, and avoiding douching or scented products/chemicals marketed for vaginal health unless otherwise directed by a healthcare provider.   "

## 2022-04-12 NOTE — PROGRESS NOTES
Today's Date: Apr 12, 2022     Patient Blanca Coleman 1990 presents to the clinic today to address   Chief Complaint   Patient presents with     Establish Care     Vaginal concerns           SUBJECTIVE     History of Present Illness:    Onset: Symptoms began a week ago. Symptom onset was/is sudden. This is a recurrent condition.  Location: vagina & vulva  Duration: constant, 1 week  Characteristics: abnormal odor, increased vaginal discharge, left pelvic pain (intermittent, mild)  Aggravating factors: none  Relieving factors: none  Radiation: none  Treatments tried: none  Potential causes per patient: patient reports that she gets BV often, no changes in soaps, no new sexual partners, and does not douche or use scented vaginal products. Has tried both PO metronidazole and MetroGel in the past for previous BV occurrences.     Denies known exposure to STI, dysuria, dyspareunia, abnormal discharge color, s/sx of pregnancy, fever, chills, malaise, or genital sores/lesions.        ROS: 10 point ROS neg other than the symptoms noted above in the HPI.    No Known Allergies     Current Outpatient Medications   Medication Instructions     bictegravir-emtricitabine-tenofovir (BIKTARVY) -25 MG per tablet 1 tablet, Oral, DAILY       Past Medical History:   Diagnosis Date     Abnormal Pap smear of cervix 04/17/2012 2/17/16, 4/4/17, 3/1/21     Cervical high risk HPV (human papillomavirus) test positive 02/17/2016        Family History   Problem Relation Age of Onset     Hypertension Mother      Breast Cancer Other         Social History     Tobacco Use     Smoking status: Never Smoker     Smokeless tobacco: Never Used   Substance Use Topics     Alcohol use: Yes     Comment: rarely     Drug use: No        History   Sexual Activity     Sexual activity: Yes     Partners: Male     Birth control/ protection: None        Immunization History   Administered Date(s) Administered     DTAP (<7y) 02/08/1994, 08/12/1996,  "09/17/1997, 04/21/1998     HPV Quadrivalent 01/31/2007, 04/12/2007, 10/13/2008     Hep B, Peds or Adolescent 12/02/2003     HepA-Adult 06/27/2011     HepB, Unspecified 02/20/1996, 04/21/1998     Hib, Unspecified 02/08/1994     Historical DTP/aP 02/08/1994, 08/12/1996, 09/17/1997     Influenza (IIV3) PF 10/05/2009     MMR 02/08/1994, 08/12/1996     Meningococcal (Menactra ) 06/27/2011     OPV, trivalent, live 02/08/1994     Polio, Unspecified  08/12/1996, 09/17/1997     TD (ADULT, 7+) 10/13/2008     TDAP Vaccine (Adacel) 10/13/2008, 07/20/2017, 07/21/2020           OBJECTIVE     /75 (BP Location: Right arm, Patient Position: Sitting, Cuff Size: Adult Large)   Pulse 79   Temp 98.1  F (36.7  C) (Oral)   Ht 1.549 m (5' 1\")   Wt 64.4 kg (142 lb)   LMP 03/18/2022   SpO2 100%   BMI 26.83 kg/m       Labs:  Lab Results   Component Value Date    WBC 2.5 (L) 09/13/2020    HGB 12.9 09/13/2020    HCT 39.5 09/13/2020     (L) 09/13/2020     09/13/2020    BUN 8 09/13/2020    CO2 30 09/13/2020      Physical Exam  Vitals and nursing note reviewed.   Constitutional:       General: She is not in acute distress.     Appearance: Normal appearance. She is not ill-appearing.   HENT:      Head: Normocephalic and atraumatic.   Eyes:      General: Lids are normal.      Conjunctiva/sclera: Conjunctivae normal.   Pulmonary:      Effort: Pulmonary effort is normal.   Genitourinary:     General: Normal vulva.      Pubic Area: No rash.       Labia:         Right: No rash, tenderness or lesion.         Left: No rash, tenderness or lesion.       Cervix: No cervical motion tenderness.      Uterus: Normal.       Adnexa: Right adnexa normal and left adnexa normal.      Comments: White discharge noted near vaginal introitus.   Skin:     Comments: Skin intact with no visible lesions.   Neurological:      General: No focal deficit present.      Mental Status: She is alert and oriented to person, place, and time.      Gait: Gait " is intact.   Psychiatric:         Mood and Affect: Mood normal.         Behavior: Behavior normal.         Thought Content: Thought content normal.         Judgment: Judgment normal.            ASSESSMENT/PLAN     (N76.0) Vaginitis and vulvovaginitis  (primary encounter diagnosis)  Comment: Wet prep and STI testing completed today to identify cause of symptoms. No systemic s/sx of infection. Will treat as indicated. Discussed prevention measures and over-the-counter measures for added prevention (e.g. boric acid and probiotic use).   Plan: NEISSERIA GONORRHOEA PCR, CHLAMYDIA TRACHOMATIS PCR,         SMEAR, Wet preparation    (Z11.3) Routine screening for STI (sexually transmitted infection)  Comment: Screening completed to rule out STI as cause of symptoms today.   Plan: NEISSERIA GONORRHOEA PCR, CHLAMYDIA TRACHOMATIS PCR, Wet         preparation     Education provided on prevention measures including:  - probiotic use to replenish healthy vaginal josé  - boric acid vaginal suppositories and their use to return vaginal pH to normal range  - cotton underwear use   - avoiding soaps, fragrances, douching, or known skin irritants in products that come in contact with vulva and vagina  - sleeping without underwear to reduce warm/moist environment for prolonged periods of time  - maintaining good nutrition and try to reduce intake of added sugars  - ensuring adequate amounts of Vitamin D    Follow-Up:  - Follow up as needed, or if symptoms worsen or fail to improve.   - Encouraged patient to return for exam + pap once symptoms resolve.     Options for treatment and follow-up care were reviewed with the patient. Patient engaged in the decision making process and verbalized understanding of the options discussed and agreed with the final plan.  AVS printed and given to patient.    ANDER Newell [facility]  [facility]  [address] [place] [address]  [id number]  [phone]

## 2022-04-12 NOTE — NURSING NOTE
Chief Complaint   Patient presents with     Establish Care     Vaginal concerns     Lucy Spencer CMA at 11:10 AM on 4/12/2022.

## 2022-04-13 LAB
C TRACH DNA SPEC QL NAA+PROBE: NEGATIVE
N GONORRHOEA DNA SPEC QL NAA+PROBE: NEGATIVE

## 2022-04-23 ENCOUNTER — HEALTH MAINTENANCE LETTER (OUTPATIENT)
Age: 32
End: 2022-04-23

## 2022-10-30 ENCOUNTER — HEALTH MAINTENANCE LETTER (OUTPATIENT)
Age: 32
End: 2022-10-30

## 2023-02-17 PROBLEM — Z21 HIV POSITIVE (H): Status: ACTIVE | Noted: 2021-11-24

## 2023-04-05 ENCOUNTER — OFFICE VISIT (OUTPATIENT)
Dept: FAMILY MEDICINE | Facility: CLINIC | Age: 33
End: 2023-04-05
Payer: COMMERCIAL

## 2023-04-05 VITALS
SYSTOLIC BLOOD PRESSURE: 133 MMHG | WEIGHT: 161 LBS | OXYGEN SATURATION: 100 % | BODY MASS INDEX: 30.42 KG/M2 | HEART RATE: 75 BPM | DIASTOLIC BLOOD PRESSURE: 87 MMHG | TEMPERATURE: 98.1 F

## 2023-04-05 DIAGNOSIS — Z11.3 SCREEN FOR STD (SEXUALLY TRANSMITTED DISEASE): ICD-10-CM

## 2023-04-05 DIAGNOSIS — R87.810 ASCUS WITH POSITIVE HIGH RISK HPV CERVICAL: ICD-10-CM

## 2023-04-05 DIAGNOSIS — Z00.00 ROUTINE HISTORY AND PHYSICAL EXAMINATION OF ADULT: Primary | ICD-10-CM

## 2023-04-05 DIAGNOSIS — R43.0 LOSS OF SMELL: ICD-10-CM

## 2023-04-05 DIAGNOSIS — R87.610 ASCUS WITH POSITIVE HIGH RISK HPV CERVICAL: ICD-10-CM

## 2023-04-05 LAB — T VAGINALIS DNA SPEC QL NAA+PROBE: NOT DETECTED

## 2023-04-05 PROCEDURE — 87661 TRICHOMONAS VAGINALIS AMPLIF: CPT

## 2023-04-05 PROCEDURE — G0124 SCREEN C/V THIN LAYER BY MD: HCPCS | Performed by: PATHOLOGY

## 2023-04-05 PROCEDURE — G0145 SCR C/V CYTO,THINLAYER,RESCR: HCPCS

## 2023-04-05 PROCEDURE — 87591 N.GONORRHOEAE DNA AMP PROB: CPT

## 2023-04-05 PROCEDURE — 87491 CHLMYD TRACH DNA AMP PROBE: CPT

## 2023-04-05 PROCEDURE — 87624 HPV HI-RISK TYP POOLED RSLT: CPT

## 2023-04-05 RX ORDER — FLUTICASONE PROPIONATE 50 MCG
1-2 SPRAY, SUSPENSION (ML) NASAL DAILY
Qty: 16 G | Refills: 11 | Status: SHIPPED | OUTPATIENT
Start: 2023-04-05

## 2023-04-05 ASSESSMENT — ENCOUNTER SYMPTOMS
SINUS PAIN: 0
CONSTITUTIONAL NEGATIVE: 1
SINUS PRESSURE: 0
MUSCULOSKELETAL NEGATIVE: 1
SORE THROAT: 0
PSYCHIATRIC NEGATIVE: 1
ALLERGIC/IMMUNOLOGIC NEGATIVE: 1
GASTROINTESTINAL NEGATIVE: 1
CARDIOVASCULAR NEGATIVE: 1
RESPIRATORY NEGATIVE: 1

## 2023-04-05 ASSESSMENT — ANXIETY QUESTIONNAIRES
2. NOT BEING ABLE TO STOP OR CONTROL WORRYING: NOT AT ALL
1. FEELING NERVOUS, ANXIOUS, OR ON EDGE: NOT AT ALL
GAD7 TOTAL SCORE: 1
GAD7 TOTAL SCORE: 1
5. BEING SO RESTLESS THAT IT IS HARD TO SIT STILL: NOT AT ALL
7. FEELING AFRAID AS IF SOMETHING AWFUL MIGHT HAPPEN: NOT AT ALL
6. BECOMING EASILY ANNOYED OR IRRITABLE: SEVERAL DAYS
3. WORRYING TOO MUCH ABOUT DIFFERENT THINGS: NOT AT ALL

## 2023-04-05 ASSESSMENT — PATIENT HEALTH QUESTIONNAIRE - PHQ9
5. POOR APPETITE OR OVEREATING: NOT AT ALL
SUM OF ALL RESPONSES TO PHQ QUESTIONS 1-9: 4

## 2023-04-05 NOTE — PROGRESS NOTES
ANNUAL WELLNESS EXAM     Today's Date: Apr 5, 2023     Patient Blanca Coleman 1990 presents to the clinic today for a preventative health visit.         SUBJECTIVE     History of Present Illness:    Decreased sense of smell  -Notices her sense of smell is not as strong  -No distorted sense of smell  -Present for several years  -Occurred after a sinus infection  -No known hx of COVID-19 infection (occurred prior to emergence of COVID-19)  -No nasal congestion or rhinorrhea  -No itching in nose    HIV positive  -Follows with [facility] Dr. Bello  -Has appt on 4/11, sees him every 3-6 months  -Consistently takes anti-virals  -No recent infections    Sexual health hx  -Had STI testing Dec 2022  -Has not been sexually activity since last tested  -Only tested for HIV and syphilis in Dec 2022  -No chlamydia, gonorrhea, or trich testing in the last 6 months  -No urinary sx  -No abd pain, fever, chills, abd vaginal discharge  -Gets regular monthly periods  -Hx of HPV positive with ASCUS      Allergies   Allergen Reactions     No Known Allergies         Past Medical History:   Diagnosis Date     Abnormal Pap smear of cervix 04/17/2012 2/17/16, 4/4/17, 3/1/21     Cervical high risk HPV (human papillomavirus) test positive 02/17/2016        Family History   Problem Relation Age of Onset     Hypertension Mother      Breast Cancer Other         Do you have a first-degree relative with a history of the following:  A. Cancer of the breast or ovaries - Yes maternal grandmother possibly had breast CA  B. Heart attack, heart pain, or stroke before the age of 55 - No  C. Unexplained death from drowning or car accident - No  D. Osteoporosis or any other significant bone health concerns - No    Social History     Tobacco Use     Smoking status: Never     Smokeless tobacco: Never   Substance Use Topics     Alcohol use: Not Currently     Comment: rarely     Drug use: No        History   Sexual  "Activity     Sexual activity: Not Currently     Partners: Male     Birth control/ protection: None             4/5/2023    10:51 AM   PHQ   PHQ-9 Total Score 4   Q9: Thoughts of better off dead/self-harm past 2 weeks Not at all        Immunization History   Administered Date(s) Administered     COVID-19 Vaccine 12+ (Pfizer 2022) 08/24/2022     COVID-19 Vaccine 12+ (Pfizer) 01/06/2022, 01/27/2022     COVID-19 Vaccine Bivalent Booster 12+ (Pfizer) 12/13/2022     DTAP (<7y) 02/08/1994, 08/12/1996, 09/17/1997, 04/21/1998     HPV Quadrivalent 01/31/2007, 04/12/2007, 10/13/2008     HepB, Unspecified 02/20/1996, 04/21/1998     Hepatitis A (ADULT 19+) 06/27/2011, 08/24/2022     Hepatits B (Peds <19Y) 12/02/2003     Hib, Unspecified 02/08/1994     Historical DTP/aP 02/08/1994, 08/12/1996, 09/17/1997     Influenza (IIV3) PF 10/05/2009     Influenza Vaccine >6 months (Alfuria,Fluzone) 01/06/2022, 12/13/2022     MMR 02/08/1994, 08/12/1996     Meningococcal ACWY (Menactra ) 06/27/2011     OPV, trivalent, live 02/08/1994     Pneumo Conj 13-V (2010&after) 01/06/2022     Pneumococcal 23 valent 05/23/2022     Polio, Unspecified  08/12/1996, 09/17/1997     TD,PF 7+ (Tenivac) 10/13/2008     TDAP Vaccine (Adacel) 10/13/2008, 07/20/2017, 07/21/2020        Health Maintenance Due   Topic Date Due     HIV SCREENING  Never done     HEPATITIS C SCREENING  Never done     YEARLY PREVENTIVE VISIT  03/01/2022     PAP FOLLOW-UP  03/01/2022     HPV FOLLOW-UP  03/01/2022      Health Maintenance components reviewed - Seasonal Influenza vaccine status is up to date & Covid-19 vaccine status is up to date.    Diet: in general, a \"healthy\" diet      Exercise: None    Review of Systems   Constitutional: Negative.    HENT: Negative for congestion, sinus pressure, sinus pain, sneezing and sore throat.    Respiratory: Negative.    Cardiovascular: Negative.    Gastrointestinal: Negative.    Genitourinary: Negative.    Musculoskeletal: Negative.  "   Allergic/Immunologic: Negative.    Psychiatric/Behavioral: Negative.    All other systems reviewed and are negative.           OBJECTIVE     /87   Pulse 75   Temp 98.1  F (36.7  C) (Oral)   Wt 73 kg (161 lb)   SpO2 100%   BMI 30.42 kg/m       Labs:  Lab Results   Component Value Date    WBC 2.5 (L) 09/13/2020    HGB 12.9 09/13/2020    HCT 39.5 09/13/2020     (L) 09/13/2020     09/13/2020    BUN 8 09/13/2020    CO2 30 09/13/2020       Physical Exam  Vitals reviewed.   Constitutional:       General: She is not in acute distress.     Appearance: Normal appearance. She is well-developed. She is not ill-appearing.   HENT:      Head: Normocephalic and atraumatic.      Right Ear: Tympanic membrane and external ear normal.      Left Ear: Tympanic membrane and external ear normal.      Nose: Nose normal. No rhinorrhea.      Mouth/Throat:      Mouth: Mucous membranes are moist.      Pharynx: No oropharyngeal exudate.   Eyes:      General:         Right eye: No discharge.         Left eye: No discharge.      Extraocular Movements: Extraocular movements intact.      Conjunctiva/sclera: Conjunctivae normal.   Neck:      Thyroid: No thyromegaly.      Trachea: No tracheal deviation.   Cardiovascular:      Rate and Rhythm: Normal rate and regular rhythm.      Pulses: Normal pulses.      Heart sounds: Normal heart sounds, S1 normal and S2 normal. No murmur heard.     No friction rub. No S3 or S4 sounds.   Pulmonary:      Effort: Pulmonary effort is normal. No respiratory distress.      Breath sounds: Normal breath sounds. No wheezing or rales.   Chest:   Breasts:     Right: No inverted nipple, mass, nipple discharge or skin change.      Left: No inverted nipple, mass, nipple discharge or skin change.   Abdominal:      General: Bowel sounds are normal.      Palpations: Abdomen is soft. There is no mass.   Genitourinary:     Labia:         Right: No rash.         Left: No rash.       Vagina: Normal.       Cervix: Normal.   Musculoskeletal:         General: Normal range of motion.      Cervical back: Normal range of motion and neck supple.      Right lower leg: No edema.      Left lower leg: No edema.   Lymphadenopathy:      Cervical: No cervical adenopathy.      Upper Body:      Right upper body: No supraclavicular or axillary adenopathy.      Left upper body: No supraclavicular or axillary adenopathy.   Skin:     General: Skin is warm and dry.      Capillary Refill: Capillary refill takes less than 2 seconds.      Findings: No rash.   Neurological:      General: No focal deficit present.      Mental Status: She is alert and oriented to person, place, and time.      Motor: No abnormal muscle tone.      Deep Tendon Reflexes: Reflexes are normal and symmetric.   Psychiatric:         Mood and Affect: Mood normal.         Behavior: Behavior normal.         Thought Content: Thought content normal.         Judgment: Judgment normal.               ASSESSMENT/PLAN     (Z00.00) Routine history and physical examination of adult  (primary encounter diagnosis)  -Discussed/Reinforced healthy diety, lifestyle, exercise and safety.  -Recommended completion of routine dental and eye exam.  -Lab screenings completed today. Results pending.     Breast exam unremarkable. Pt had mammogram in 20221 due to breast lump. Normal result. Breast center recommended annual screening at age 40.    (R87.610,  R87.810) ASCUS with positive high risk HPV cervical  Plan: GYNECOLOGIC CYTOLOGY (PAP SMEAR LAB ORDER)  4/17/12 ASCUS pap, neg HR HPV @ age 21 (in Care Everywhere)   2/17/16 ASCUS pap, + HR HPV (type not specified) @ age 25 (in Care Everywhere)   4/4/17 ASCUS pap, neg HR HPV (in Care Everywhere)   3/1/21 ASCUS pap, neg HR HPV. Plan 1 year cotest    Hx of HIV positive. Patient has appointment with ID in 1 week. Will defer HIV specific labs to ID.     (Z11.3) Screen for STD (sexually transmitted disease)  Plan: Chlamydia trachomatis/Neisseria  gonorrhoeae by         PCR - Clinic Collect, Trichomonas vaginalis DNA        PCR    (R43.0) Loss of smell  Plan: fluticasone (FLONASE) 50 MCG/ACT nasal spray   Considered possible residual symptoms from viral infection, nasal polyps, vs allergies. Exam unremarkable. Will trial a nasal steroid to see if improvement. If no improvement in 2-4 weeks, may consider ENT referral    Follow-Up:  Follow up in one year, or sooner if needed.     Patient engaged in their plan of care. Patient verbalized understanding and agreed with the final plan.  AVS printed and given to patient.    Cori Osorio, KALEN   [facility]  [address] [id number] [phone]

## 2023-04-06 LAB
C TRACH DNA SPEC QL PROBE+SIG AMP: NEGATIVE
N GONORRHOEA DNA SPEC QL NAA+PROBE: NEGATIVE

## 2023-04-07 LAB
BKR LAB AP GYN ADEQUACY: ABNORMAL
BKR LAB AP GYN INTERPRETATION: ABNORMAL
BKR LAB AP HPV REFLEX: ABNORMAL
BKR LAB AP PREVIOUS ABNL DX: ABNORMAL
BKR LAB AP PREVIOUS ABNORMAL: ABNORMAL
PATH REPORT.COMMENTS IMP SPEC: ABNORMAL
PATH REPORT.COMMENTS IMP SPEC: ABNORMAL
PATH REPORT.RELEVANT HX SPEC: ABNORMAL

## 2023-04-10 LAB
HUMAN PAPILLOMA VIRUS 16 DNA: NEGATIVE
HUMAN PAPILLOMA VIRUS 18 DNA: NEGATIVE
HUMAN PAPILLOMA VIRUS FINAL DIAGNOSIS: ABNORMAL
HUMAN PAPILLOMA VIRUS OTHER HR: POSITIVE

## 2023-04-14 ENCOUNTER — VIRTUAL VISIT (OUTPATIENT)
Dept: FAMILY MEDICINE | Facility: CLINIC | Age: 33
End: 2023-04-14
Payer: COMMERCIAL

## 2023-04-14 DIAGNOSIS — R87.810 CERVICAL HIGH RISK HUMAN PAPILLOMAVIRUS (HPV) DNA TEST POSITIVE: Primary | ICD-10-CM

## 2023-04-14 NOTE — PROGRESS NOTES
Blanca is a 32 year old who is being evaluated via a billable telephone visit.      What phone number would you like to be contacted at? [phone]  How would you like to obtain your AVS? Jesus  Distant Location (provider location):  On-site    Assessment & Plan   Problem List Items Addressed This Visit    None  Visit Diagnoses     Cervical high risk human papillomavirus (HPV) DNA test positive    -  Primary         4/17/12 ASCUS pap, neg HR HPV @ age 21 (in Care Everywhere)   2/17/16 ASCUS pap, + HR HPV (type not specified) @ age 25 (in Care Everywhere)   4/4/17 ASCUS pap, neg HR HPV (in Care Everywhere)   3/1/21 ASCUS pap, neg HR HPV. Plan 1 year cotest    PAP 2023 LSIL, HPV negative. Per ASCCP guidelines, plan for repeat PAP with cotest in 1 year.      No follow-ups on file.    Cori Osorio, NP  [facility]    Subjective   Blanca is a 32 year old, presenting for the following health issues:  Results    HPI     Visit to discuss the results of PAP      Review of Systems   See HPI      Objective           Vitals:  No vitals were obtained today due to virtual visit.    Physical Exam   Patient sounded healthy and alert per telephone call.            Phone call duration: 5 minutes

## 2024-06-09 ENCOUNTER — HEALTH MAINTENANCE LETTER (OUTPATIENT)
Age: 34
End: 2024-06-09

## 2025-06-15 ENCOUNTER — HEALTH MAINTENANCE LETTER (OUTPATIENT)
Age: 35
End: 2025-06-15